# Patient Record
Sex: FEMALE | NOT HISPANIC OR LATINO | URBAN - METROPOLITAN AREA
[De-identification: names, ages, dates, MRNs, and addresses within clinical notes are randomized per-mention and may not be internally consistent; named-entity substitution may affect disease eponyms.]

---

## 2017-01-24 ENCOUNTER — OFFICE (OUTPATIENT)
Dept: URBAN - METROPOLITAN AREA OTHER 6 | Facility: OTHER | Age: 42
End: 2017-01-24

## 2017-01-24 VITALS
HEART RATE: 88 BPM | HEIGHT: 65 IN | WEIGHT: 153 LBS | DIASTOLIC BLOOD PRESSURE: 68 MMHG | SYSTOLIC BLOOD PRESSURE: 120 MMHG

## 2017-01-24 DIAGNOSIS — K59.00 CONSTIPATION, UNSPECIFIED: ICD-10-CM

## 2017-01-24 DIAGNOSIS — K22.4 DYSKINESIA OF ESOPHAGUS: ICD-10-CM

## 2017-01-24 PROCEDURE — 99202 OFFICE O/P NEW SF 15 MIN: CPT

## 2017-01-24 RX ORDER — POLYETHYLENE GLYCOL 3350 17 G/17G
17 POWDER, FOR SOLUTION ORAL
Qty: 1 | Refills: 11 | Status: ACTIVE
Start: 2017-01-24

## 2017-01-24 RX ORDER — OMEPRAZOLE 40 MG/1
40 CAPSULE, DELAYED RELEASE ORAL
Qty: 30 | Refills: 11 | Status: ACTIVE
Start: 2017-01-24

## 2017-03-28 ENCOUNTER — OFFICE (OUTPATIENT)
Dept: URBAN - METROPOLITAN AREA OTHER 6 | Facility: OTHER | Age: 42
End: 2017-03-28

## 2017-03-28 VITALS
WEIGHT: 158 LBS | HEIGHT: 65 IN | HEART RATE: 72 BPM | DIASTOLIC BLOOD PRESSURE: 70 MMHG | SYSTOLIC BLOOD PRESSURE: 120 MMHG

## 2017-03-28 DIAGNOSIS — K59.00 CONSTIPATION, UNSPECIFIED: ICD-10-CM

## 2017-03-28 DIAGNOSIS — K22.4 DYSKINESIA OF ESOPHAGUS: ICD-10-CM

## 2017-03-28 PROCEDURE — 99212 OFFICE O/P EST SF 10 MIN: CPT

## 2017-07-12 ENCOUNTER — TELEPHONE (OUTPATIENT)
Dept: OBSTETRICS AND GYNECOLOGY | Age: 42
End: 2017-07-12

## 2017-07-12 DIAGNOSIS — R01.1 NEWLY RECOGNIZED HEART MURMUR: Primary | ICD-10-CM

## 2017-10-09 ENCOUNTER — TRANSCRIBE ORDERS (OUTPATIENT)
Dept: ADMINISTRATIVE | Facility: HOSPITAL | Age: 42
End: 2017-10-09

## 2017-10-09 DIAGNOSIS — Z12.39 ENCOUNTER FOR SCREENING BREAST EXAMINATION: Primary | ICD-10-CM

## 2017-10-18 ENCOUNTER — HOSPITAL ENCOUNTER (OUTPATIENT)
Dept: MAMMOGRAPHY | Facility: HOSPITAL | Age: 42
Discharge: HOME | End: 2017-10-18
Admitting: OBSTETRICS & GYNECOLOGY

## 2017-10-18 DIAGNOSIS — Z12.39 ENCOUNTER FOR SCREENING BREAST EXAMINATION: ICD-10-CM

## 2017-10-18 PROCEDURE — G0202 SCR MAMMO BI INCL CAD: HCPCS

## 2018-02-01 ENCOUNTER — OFFICE VISIT (OUTPATIENT)
Dept: OBSTETRICS AND GYNECOLOGY | Age: 43
End: 2018-02-01

## 2018-02-01 VITALS
BODY MASS INDEX: 26.02 KG/M2 | DIASTOLIC BLOOD PRESSURE: 68 MMHG | WEIGHT: 156.2 LBS | HEIGHT: 65 IN | SYSTOLIC BLOOD PRESSURE: 138 MMHG

## 2018-02-01 DIAGNOSIS — E03.4 HYPOTHYROIDISM DUE TO ACQUIRED ATROPHY OF THYROID: ICD-10-CM

## 2018-02-01 DIAGNOSIS — K21.9 GASTROESOPHAGEAL REFLUX DISEASE, ESOPHAGITIS PRESENCE NOT SPECIFIED: ICD-10-CM

## 2018-02-01 DIAGNOSIS — Z00.00 ANNUAL PHYSICAL EXAM: ICD-10-CM

## 2018-02-01 DIAGNOSIS — N95.1 MENOPAUSAL SYMPTOMS: ICD-10-CM

## 2018-02-01 DIAGNOSIS — K58.1 IRRITABLE BOWEL SYNDROME WITH CONSTIPATION: ICD-10-CM

## 2018-02-01 DIAGNOSIS — Z78.0 MENOPAUSE: Primary | ICD-10-CM

## 2018-02-01 PROCEDURE — 99396 PREV VISIT EST AGE 40-64: CPT | Performed by: OBSTETRICS & GYNECOLOGY

## 2018-02-01 RX ORDER — OMEPRAZOLE 40 MG/1
CAPSULE, DELAYED RELEASE ORAL
Refills: 11 | COMMUNITY
Start: 2017-12-22 | End: 2019-06-03 | Stop reason: SDUPTHER

## 2018-02-01 RX ORDER — ACYCLOVIR 400 MG/1
400 TABLET ORAL DAILY
Qty: 90 TABLET | Refills: 1 | Status: SHIPPED | OUTPATIENT
Start: 2018-02-01 | End: 2021-11-16 | Stop reason: SDUPTHER

## 2018-02-01 NOTE — PROGRESS NOTES
Subjective   Nikole Barnes is a 42 y.o. female is being seen today for an annual exam.  Chief Complaint   Patient presents with   • Gynecologic Exam   .    History of Present Illness  Patient is here for an annual check.  Her family is doing well her daughter is a nurse at [employer] L&[employer] the other kids okay at this time.  No new family history.  Physical problem with a brother which she creates some family difficulties.  She did go see Dr. montaño and the patient and she is much better he demonstrate Natrecor off of medicines she takes MiraLAX every day and it seemed to help immensely.  She went through tummy tuck did fairly well like to pain things are quite perfect but overall is fairly fairly good.  She also needs a PCP.  I gave her the number to call for that at [facility].  She does also complain of a few menopausal symptoms so we will check a blood level today  The following portions of the patient's history were reviewed and updated as appropriate: allergies, current medications, past family history, past medical history, past social history, past surgical history and problem list.    Vitals:    18 0945   BP: 138/68       PAST MEDICAL HISTORY  Past Medical History:   Diagnosis Date   • Disease of thyroid gland      OB History      Para Term  AB Living    3 3 3       SAB TAB Ectopic Multiple Live Births                Past Surgical History:   Procedure Laterality Date   • BACK SURGERY     • CHOLECYSTECTOMY     • HYSTERECTOMY       Family History   Problem Relation Age of Onset   • Breast cancer Mother    • Breast cancer Maternal Aunt      History   Smoking Status   • Never Smoker   Smokeless Tobacco   • Never Used       Current Outpatient Prescriptions:   •  acyclovir (ZOVIRAX) 400 MG tablet, Take 1 tablet by mouth Daily. Take no more than 5 doses a day., Disp: 90 tablet, Rfl: 1  •  ARMOUR THYROID 90 MG tablet, , Disp: , Rfl:   •  diltiazem (CARDIZEM) 30 MG tablet, One by mouth every 6 hours  as needed for esophageal spasm, Disp: 30 tablet, Rfl: 0  •  omeprazole (priLOSEC) 40 MG capsule, TK ONE C PO  QD, Disp: , Rfl: 11    There is no immunization history on file for this patient.    Review of Systems   Constitutional: Negative for chills, fatigue, fever and unexpected weight change.   Respiratory: Negative for shortness of breath and wheezing.    Cardiovascular: Negative for chest pain.   Gastrointestinal: Positive for constipation. Negative for abdominal distention, abdominal pain, blood in stool, diarrhea and nausea.   Genitourinary: Negative for difficulty urinating, dyspareunia, dysuria, frequency, hematuria, menstrual problem, pelvic pain, urgency and vaginal discharge.   Skin: Negative for rash.       Objective   Physical Exam   Constitutional: She is oriented to person, place, and time. Vital signs are normal. She appears well-developed and well-nourished.   Neck: No thyromegaly present.   Cardiovascular: Normal rate, regular rhythm and normal heart sounds.    Pulmonary/Chest: Effort normal. Right breast exhibits no inverted nipple, no mass, no nipple discharge, no skin change and no tenderness. Left breast exhibits no inverted nipple, no mass, no nipple discharge, no skin change and no tenderness. Breasts are symmetrical. There is no breast swelling.   Abdominal: Soft.   Genitourinary: Vagina normal and uterus normal. No breast tenderness, discharge or bleeding. Pelvic exam was performed with patient supine. No labial fusion. There is no rash, tenderness, lesion or injury on the right labia. There is no rash, tenderness, lesion or injury on the left labia. Cervix exhibits no motion tenderness, no discharge and no friability. Right adnexum displays no mass, no tenderness and no fullness. Left adnexum displays no mass, no tenderness and no fullness.   Neurological: She is alert and oriented to person, place, and time.   Psychiatric: She has a normal mood and affect.   Vitals  reviewed.        Assessment/Plan   Nikole was seen today for gynecologic exam.    Diagnoses and all orders for this visit:    Menopause  -     Follicle Stimulating Hormone    Annual physical exam    Irritable bowel syndrome with constipation    Menopausal symptoms    Hypothyroidism due to acquired atrophy of thyroid    Gastroesophageal reflux disease, esophagitis presence not specified    Other orders  -     acyclovir (ZOVIRAX) 400 MG tablet; Take 1 tablet by mouth Daily. Take no more than 5 doses a day.    Normal exam today.  Pap smear will be due next year.  Mammogram was October 2017.  She also felt something the left breast with minor discomfort but now cannot feel anything.  My exam was negative today.  No colonoscopy yet she'll have one at 44 diet and exercise were discussed come back in year

## 2018-02-02 LAB — FSH SERPL-ACNC: 16.6 MIU/ML

## 2018-02-26 ENCOUNTER — TELEPHONE (OUTPATIENT)
Dept: OBSTETRICS AND GYNECOLOGY | Age: 43
End: 2018-02-26

## 2018-02-27 ENCOUNTER — TRANSCRIBE ORDERS (OUTPATIENT)
Dept: ADMINISTRATIVE | Facility: HOSPITAL | Age: 43
End: 2018-02-27

## 2018-02-27 DIAGNOSIS — R31.21 ASYMPTOMATIC MICROSCOPIC HEMATURIA: Primary | ICD-10-CM

## 2018-03-02 ENCOUNTER — APPOINTMENT (OUTPATIENT)
Dept: CT IMAGING | Facility: HOSPITAL | Age: 43
End: 2018-03-02

## 2018-03-02 ENCOUNTER — HOSPITAL ENCOUNTER (OUTPATIENT)
Dept: GENERAL RADIOLOGY | Facility: HOSPITAL | Age: 43
Discharge: HOME | End: 2018-03-02

## 2018-03-02 ENCOUNTER — HOSPITAL ENCOUNTER (OUTPATIENT)
Dept: CT IMAGING | Facility: HOSPITAL | Age: 43
Discharge: HOME | End: 2018-03-02
Admitting: NURSE PRACTITIONER

## 2018-03-02 ENCOUNTER — TRANSCRIBE ORDERS (OUTPATIENT)
Dept: ADMINISTRATIVE | Facility: HOSPITAL | Age: 43
End: 2018-03-02

## 2018-03-02 DIAGNOSIS — R31.9 HEMATURIA, UNSPECIFIED TYPE: Primary | ICD-10-CM

## 2018-03-02 DIAGNOSIS — R31.21 ASYMPTOMATIC MICROSCOPIC HEMATURIA: ICD-10-CM

## 2018-03-02 DIAGNOSIS — R31.9 HEMATURIA, UNSPECIFIED TYPE: ICD-10-CM

## 2018-03-02 PROCEDURE — 74018 RADEX ABDOMEN 1 VIEW: CPT

## 2018-03-02 PROCEDURE — 74176 CT ABD & PELVIS W/O CONTRAST: CPT

## 2018-03-07 ENCOUNTER — TELEPHONE (OUTPATIENT)
Dept: OBSTETRICS AND GYNECOLOGY | Age: 43
End: 2018-03-07

## 2018-03-07 NOTE — TELEPHONE ENCOUNTER
----- Message from Molina Choudhary MD sent at 2/26/2018  9:06 AM EST -----  The patient she is definitely early menopause very little  minimally into menopause but starting nonetheless.  She might want to try estroven  over-the-counter initially and that does not help give me a call

## 2018-07-05 ENCOUNTER — OFFICE VISIT (OUTPATIENT)
Dept: GASTROENTEROLOGY | Facility: CLINIC | Age: 43
End: 2018-07-05

## 2018-07-05 VITALS
HEIGHT: 65 IN | SYSTOLIC BLOOD PRESSURE: 118 MMHG | DIASTOLIC BLOOD PRESSURE: 62 MMHG | BODY MASS INDEX: 25.19 KG/M2 | WEIGHT: 151.2 LBS

## 2018-07-05 DIAGNOSIS — K58.2 IRRITABLE BOWEL SYNDROME WITH BOTH CONSTIPATION AND DIARRHEA: ICD-10-CM

## 2018-07-05 DIAGNOSIS — Z12.11 ENCOUNTER FOR SCREENING FOR MALIGNANT NEOPLASM OF COLON: ICD-10-CM

## 2018-07-05 DIAGNOSIS — K21.9 GASTROESOPHAGEAL REFLUX DISEASE, ESOPHAGITIS PRESENCE NOT SPECIFIED: Primary | ICD-10-CM

## 2018-07-05 PROCEDURE — 99214 OFFICE O/P EST MOD 30 MIN: CPT | Performed by: INTERNAL MEDICINE

## 2018-07-05 RX ORDER — OMEPRAZOLE 40 MG/1
40 CAPSULE, DELAYED RELEASE ORAL DAILY
Qty: 90 CAPSULE | Refills: 3 | Status: SHIPPED | OUTPATIENT
Start: 2018-07-05 | End: 2020-06-10 | Stop reason: SDUPTHER

## 2018-07-05 RX ORDER — HYOSCYAMINE SULFATE EXTENDED-RELEASE 0.38 MG/1
0.38 TABLET ORAL EVERY 12 HOURS PRN
Qty: 60 TABLET | Refills: 12 | Status: SHIPPED | OUTPATIENT
Start: 2018-07-05 | End: 2022-10-25

## 2018-07-05 NOTE — PROGRESS NOTES
PATIENT INFORMATION  Nikole Barnes       - 1975    CHIEF COMPLAINT  Chief Complaint   Patient presents with   • Heartburn     follow up   • Abdominal Pain       HISTORY OF PRESENT ILLNESS  Stil with Post prandial cramps and diarrhea but no bleeding and no night awakening and no real food asocc and does treat her constripation with daily Miralax and goes 6-7 days a week        Heartburn   She complains of abdominal pain and heartburn. She reports no coughing, no early satiety, no hoarse voice or no sore throat. This is a chronic problem. The current episode started more than 1 year ago. The problem occurs rarely. The problem has been resolved. The heartburn duration is several minutes. The heartburn is of mild intensity. The heartburn does not wake her from sleep. The heartburn does not limit her activity. The heartburn doesn't change with position. Associated symptoms include fatigue. She has tried a PPI for the symptoms. The treatment provided significant relief. Past procedures include an EGD.   Abdominal Pain   This is a chronic problem. The current episode started more than 1 year ago. The onset quality is gradual. The problem occurs every several days. The pain is located in the LLQ and RLQ. The pain is at a severity of 6/10. The pain is moderate. The quality of the pain is colicky and cramping. The abdominal pain does not radiate. Associated symptoms include diarrhea. Pertinent negatives include no anorexia or constipation. The pain is aggravated by eating. The pain is relieved by bowel movements. The treatment provided no relief. Prior diagnostic workup includes lower endoscopy. Her past medical history is significant for GERD.           REVIEW OF SYSTEMS  Review of Systems   Constitutional: Positive for fatigue.   HENT: Negative for hoarse voice and sore throat.    Respiratory: Negative for cough.    Cardiovascular: Positive for palpitations.   Gastrointestinal: Positive for abdominal pain,  diarrhea and heartburn. Negative for anorexia and constipation.   Musculoskeletal: Positive for back pain.   Neurological: Positive for numbness.   Psychiatric/Behavioral: The patient is nervous/anxious.    All other systems reviewed and are negative.        ACTIVE PROBLEMS  Patient Active Problem List    Diagnosis   • Irritable bowel syndrome with both constipation and diarrhea [K58.2]   • Menopausal symptoms [N95.1]   • Hypothyroidism due to acquired atrophy of thyroid [E03.4]   • Gastroesophageal reflux disease [K21.9]         PAST MEDICAL HISTORY  Past Medical History:   Diagnosis Date   • Disease of thyroid gland          SURGICAL HISTORY  Past Surgical History:   Procedure Laterality Date   • BACK SURGERY     • CHOLECYSTECTOMY     • HYSTERECTOMY           FAMILY HISTORY  Family History   Problem Relation Age of Onset   • Breast cancer Mother    • Breast cancer Maternal Aunt          SOCIAL HISTORY  Social History     Occupational History   • Not on file.     Social History Main Topics   • Smoking status: Never Smoker   • Smokeless tobacco: Never Used   • Alcohol use No   • Drug use: No   • Sexual activity: Not on file         CURRENT MEDICATIONS    Current Outpatient Prescriptions:   •  ARMOUR THYROID 90 MG tablet, , Disp: , Rfl:   •  omeprazole (priLOSEC) 40 MG capsule, TK ONE C PO  QD, Disp: , Rfl: 11  •  acyclovir (ZOVIRAX) 400 MG tablet, Take 1 tablet by mouth Daily. Take no more than 5 doses a day., Disp: 90 tablet, Rfl: 1  •  diltiazem (CARDIZEM) 30 MG tablet, One by mouth every 6 hours as needed for esophageal spasm, Disp: 30 tablet, Rfl: 0  •  hyoscyamine (LEVBID) 0.375 MG 12 hr tablet, Take 1 tablet by mouth Every 12 (Twelve) Hours As Needed for Cramping., Disp: 60 tablet, Rfl: 12  •  omeprazole (priLOSEC) 40 MG capsule, Take 1 capsule by mouth Daily., Disp: 90 capsule, Rfl: 3    ALLERGIES  Align [acidophilus]    VITALS  Vitals:    07/05/18 0914   BP: 118/62   Weight: 68.6 kg (151 lb 3.2 oz)   Height:  "165.1 cm (65\")       LAST RESULTS   Office Visit on 02/01/2018   Component Date Value Ref Range Status   • FSH 02/01/2018 16.6  mIU/mL Final    Comment:                     Adult Female:                        Follicular phase      3.5 -  12.5                        Ovulation phase       4.7 -  21.5                        Luteal phase          1.7 -   7.7                        Postmenopausal       25.8 - 134.8       No results found.    PHYSICAL EXAM  Physical Exam   Constitutional: She is oriented to person, place, and time. She appears well-developed and well-nourished.   HENT:   Head: Normocephalic.   Mouth/Throat: Oropharynx is clear and moist.   Eyes: Conjunctivae and EOM are normal. Pupils are equal, round, and reactive to light. No scleral icterus.   Neck: Normal range of motion. Neck supple. No thyromegaly present.   Cardiovascular: Normal rate, regular rhythm, normal heart sounds and intact distal pulses.  Exam reveals no gallop.    No murmur heard.  Pulmonary/Chest: Effort normal and breath sounds normal. She has no wheezes. She has no rales.   Abdominal: Soft. Bowel sounds are normal. She exhibits no shifting dullness, no distension, no fluid wave, no abdominal bruit, no ascites and no mass. There is no hepatosplenomegaly. There is tenderness in the epigastric area. There is no guarding and negative Rojo's sign. Hernia confirmed negative in the ventral area.   Musculoskeletal: Normal range of motion. She exhibits no edema.   Lymphadenopathy:     She has no cervical adenopathy.   Neurological: She is alert and oriented to person, place, and time.   Skin: Skin is warm and dry. No rash noted. She is not diaphoretic. No erythema.   Psychiatric: She has a normal mood and affect. Her behavior is normal.       ASSESSMENT  Diagnoses and all orders for this visit:    Gastroesophageal reflux disease, esophagitis presence not specified    Irritable bowel syndrome with both constipation and diarrhea  -   Home " lactose challenge    Encounter for screening for malignant neoplasm of colon   - Will put in for Recall in 2019    Other orders  -     omeprazole (priLOSEC) 40 MG capsule; Take 1 capsule by mouth Daily.  -     hyoscyamine (LEVBID) 0.375 MG 12 hr tablet; Take 1 tablet by mouth Every 12 (Twelve) Hours As Needed for Cramping.          PLAN  Return in about 3 months (around 10/5/2018).

## 2018-10-15 ENCOUNTER — OFFICE VISIT (OUTPATIENT)
Dept: GASTROENTEROLOGY | Facility: CLINIC | Age: 43
End: 2018-10-15

## 2018-10-15 VITALS
HEIGHT: 65 IN | WEIGHT: 145.2 LBS | BODY MASS INDEX: 24.19 KG/M2 | DIASTOLIC BLOOD PRESSURE: 66 MMHG | SYSTOLIC BLOOD PRESSURE: 120 MMHG

## 2018-10-15 DIAGNOSIS — K58.2 IRRITABLE BOWEL SYNDROME WITH BOTH CONSTIPATION AND DIARRHEA: Primary | ICD-10-CM

## 2018-10-15 DIAGNOSIS — Z83.71 FAMILY HISTORY OF POLYPS IN THE COLON: ICD-10-CM

## 2018-10-15 DIAGNOSIS — K21.9 GASTROESOPHAGEAL REFLUX DISEASE, ESOPHAGITIS PRESENCE NOT SPECIFIED: ICD-10-CM

## 2018-10-15 PROCEDURE — 99213 OFFICE O/P EST LOW 20 MIN: CPT | Performed by: INTERNAL MEDICINE

## 2018-10-15 NOTE — PROGRESS NOTES
PATIENT INFORMATION  Nikole Barnes       - 1975    CHIEF COMPLAINT  Chief Complaint   Patient presents with   • Constipation     2 mo follow up   • Diarrhea   • Heartburn       HISTORY OF PRESENT ILLNESS  Follow up of GERD and Functional Constipation and uses miralax and could NOT tolerate the LEVBID due to Diarrhea, so is on Omeprazole and Miralax only her colo0n is due 2019 and no reason for an EGD at this point.    Has had 2 colon in the past and was prob at [facility] by Chase and Jyoti but last one was  and negative, but her father has had polyps so will start q 5 yr exams next year.            REVIEW OF SYSTEMS  Review of Systems   Constitutional: Positive for activity change and fatigue.   Gastrointestinal: Positive for constipation and diarrhea.        Reflux   Musculoskeletal: Positive for back pain.   Neurological: Positive for headaches.   Psychiatric/Behavioral: Positive for sleep disturbance. The patient is nervous/anxious.    All other systems reviewed and are negative.        ACTIVE PROBLEMS  Patient Active Problem List    Diagnosis   • Irritable bowel syndrome with both constipation and diarrhea [K58.2]   • Menopausal symptoms [N95.1]   • Hypothyroidism due to acquired atrophy of thyroid [E03.4]   • Gastroesophageal reflux disease [K21.9]         PAST MEDICAL HISTORY  Past Medical History:   Diagnosis Date   • Disease of thyroid gland          SURGICAL HISTORY  Past Surgical History:   Procedure Laterality Date   • BACK SURGERY     • CHOLECYSTECTOMY     • COLONOSCOPY     • HYSTERECTOMY           FAMILY HISTORY  Family History   Problem Relation Age of Onset   • Breast cancer Mother    • Breast cancer Maternal Aunt    • Colon polyps Father    • Colon polyps Paternal Grandmother    • Colon cancer Neg Hx          SOCIAL HISTORY  Social History     Occupational History   • Not on file.     Social History Main Topics   • Smoking status: Never Smoker   • Smokeless tobacco: Never Used   •  "Alcohol use No   • Drug use: No   • Sexual activity: Not on file       Debilities/Disabilities Identified: None    Emotional Behavior: Appropriate    CURRENT MEDICATIONS    Current Outpatient Prescriptions:   •  acyclovir (ZOVIRAX) 400 MG tablet, Take 1 tablet by mouth Daily. Take no more than 5 doses a day., Disp: 90 tablet, Rfl: 1  •  ARMOUR THYROID 90 MG tablet, , Disp: , Rfl:   •  diltiazem (CARDIZEM) 30 MG tablet, One by mouth every 6 hours as needed for esophageal spasm, Disp: 30 tablet, Rfl: 0  •  hyoscyamine (LEVBID) 0.375 MG 12 hr tablet, Take 1 tablet by mouth Every 12 (Twelve) Hours As Needed for Cramping., Disp: 60 tablet, Rfl: 12  •  omeprazole (priLOSEC) 40 MG capsule, TK ONE C PO  QD, Disp: , Rfl: 11  •  omeprazole (priLOSEC) 40 MG capsule, Take 1 capsule by mouth Daily., Disp: 90 capsule, Rfl: 3    ALLERGIES  Align [acidophilus]    VITALS  Vitals:    10/15/18 1400   BP: 120/66   Weight: 65.9 kg (145 lb 3.2 oz)   Height: 165.1 cm (65\")       LAST RESULTS   Office Visit on 02/01/2018   Component Date Value Ref Range Status   • FSH 02/01/2018 16.6  mIU/mL Final    Comment:                     Adult Female:                        Follicular phase      3.5 -  12.5                        Ovulation phase       4.7 -  21.5                        Luteal phase          1.7 -   7.7                        Postmenopausal       25.8 - 134.8       No results found.    PHYSICAL EXAM  Physical Exam   Constitutional: She is oriented to person, place, and time. She appears well-developed and well-nourished.   HENT:   Head: Normocephalic and atraumatic.   Eyes: Pupils are equal, round, and reactive to light. Conjunctivae and EOM are normal. No scleral icterus.   Neck: Normal range of motion. Neck supple. No thyromegaly present.   Cardiovascular: Normal rate, regular rhythm, normal heart sounds and intact distal pulses.  Exam reveals no gallop.    No murmur heard.  Pulmonary/Chest: Effort normal and breath sounds normal. " She has no wheezes. She has no rales.   Abdominal: Soft. Bowel sounds are normal. She exhibits no shifting dullness, no distension, no fluid wave, no abdominal bruit, no ascites and no mass. There is no hepatosplenomegaly. There is no tenderness. There is no guarding and negative Rojo's sign. Hernia confirmed negative in the ventral area.   Musculoskeletal: Normal range of motion. She exhibits no edema.   Lymphadenopathy:     She has no cervical adenopathy.   Neurological: She is alert and oriented to person, place, and time.   Skin: Skin is warm and dry. No rash noted. She is not diaphoretic. No erythema.   Psychiatric: She has a normal mood and affect. Her behavior is normal.       ASSESSMENT  Diagnoses and all orders for this visit:    Irritable bowel syndrome with both constipation and diarrhea    Gastroesophageal reflux disease, esophagitis presence not specified    Family history of polyps in the colon          PLAN  Return in about 6 months (around 4/15/2019).     If we cant locate her last colon (2009) then recall on her birth month (Aug)

## 2018-11-06 ENCOUNTER — APPOINTMENT (OUTPATIENT)
Dept: ONCOLOGY | Facility: CLINIC | Age: 43
End: 2018-11-06

## 2018-11-06 ENCOUNTER — APPOINTMENT (OUTPATIENT)
Dept: LAB | Facility: HOSPITAL | Age: 43
End: 2018-11-06

## 2018-11-07 ENCOUNTER — CONSULT (OUTPATIENT)
Dept: ONCOLOGY | Facility: CLINIC | Age: 43
End: 2018-11-07

## 2018-11-07 ENCOUNTER — LAB (OUTPATIENT)
Dept: LAB | Facility: HOSPITAL | Age: 43
End: 2018-11-07

## 2018-11-07 ENCOUNTER — APPOINTMENT (OUTPATIENT)
Dept: LAB | Facility: HOSPITAL | Age: 43
End: 2018-11-07

## 2018-11-07 VITALS
TEMPERATURE: 99.2 F | BODY MASS INDEX: 23.37 KG/M2 | HEIGHT: 66 IN | SYSTOLIC BLOOD PRESSURE: 122 MMHG | RESPIRATION RATE: 16 BRPM | WEIGHT: 145.4 LBS | DIASTOLIC BLOOD PRESSURE: 80 MMHG | HEART RATE: 84 BPM | OXYGEN SATURATION: 100 %

## 2018-11-07 DIAGNOSIS — R79.89 ABNORMAL CBC: Primary | ICD-10-CM

## 2018-11-07 DIAGNOSIS — C43.59 MALIGNANT MELANOMA OF TORSO EXCLUDING BREAST (HCC): Primary | ICD-10-CM

## 2018-11-07 PROBLEM — C43.9 MELANOMA (HCC): Status: ACTIVE | Noted: 2018-11-07

## 2018-11-07 LAB
BASOPHILS # BLD AUTO: 0.03 10*3/MM3 (ref 0–0.1)
BASOPHILS NFR BLD AUTO: 0.3 % (ref 0–1.1)
DEPRECATED RDW RBC AUTO: 39.3 FL (ref 37–49)
EOSINOPHIL # BLD AUTO: 0.04 10*3/MM3 (ref 0–0.36)
EOSINOPHIL NFR BLD AUTO: 0.4 % (ref 1–5)
ERYTHROCYTE [DISTWIDTH] IN BLOOD BY AUTOMATED COUNT: 11.9 % (ref 11.7–14.5)
HCT VFR BLD AUTO: 40.4 % (ref 34–45)
HGB BLD-MCNC: 13.6 G/DL (ref 11.5–14.9)
IMM GRANULOCYTES # BLD: 0.04 10*3/MM3 (ref 0–0.03)
IMM GRANULOCYTES NFR BLD: 0.4 % (ref 0–0.5)
LYMPHOCYTES # BLD AUTO: 2.69 10*3/MM3 (ref 1–3.5)
LYMPHOCYTES NFR BLD AUTO: 26.7 % (ref 20–49)
MCH RBC QN AUTO: 30.4 PG (ref 27–33)
MCHC RBC AUTO-ENTMCNC: 33.7 G/DL (ref 32–35)
MCV RBC AUTO: 90.4 FL (ref 83–97)
MONOCYTES # BLD AUTO: 0.77 10*3/MM3 (ref 0.25–0.8)
MONOCYTES NFR BLD AUTO: 7.6 % (ref 4–12)
NEUTROPHILS # BLD AUTO: 6.52 10*3/MM3 (ref 1.5–7)
NEUTROPHILS NFR BLD AUTO: 64.6 % (ref 39–75)
NRBC BLD MANUAL-RTO: 0 /100 WBC (ref 0–0)
PLATELET # BLD AUTO: 207 10*3/MM3 (ref 150–375)
PMV BLD AUTO: 9.9 FL (ref 8.9–12.1)
RBC # BLD AUTO: 4.47 10*6/MM3 (ref 3.9–5)
WBC NRBC COR # BLD: 10.09 10*3/MM3 (ref 4–10)

## 2018-11-07 PROCEDURE — 36415 COLL VENOUS BLD VENIPUNCTURE: CPT | Performed by: INTERNAL MEDICINE

## 2018-11-07 PROCEDURE — 99245 OFF/OP CONSLTJ NEW/EST HI 55: CPT | Performed by: INTERNAL MEDICINE

## 2018-11-07 PROCEDURE — 85025 COMPLETE CBC W/AUTO DIFF WBC: CPT | Performed by: INTERNAL MEDICINE

## 2018-11-07 NOTE — PROGRESS NOTES
.     REASON FOR CONSULTATION:   History of melanoma and recently reported weight loss.  Provide an opinion on any further workup or treatment                             REQUESTING PHYSICIAN: Josue Barger MD  RECORDS OBTAINED:  Records of the patients history including those obtained from the referring provider were reviewed and summarized in detail.    HISTORY OF PRESENT ILLNESS:  The patient is a 43 y.o. year old female  who is here for follow-up with the above-mentioned history.    Biopsy-proven superficial spreading melanoma 0.2 mm in depth without ulceration or abnormal mitotic activity on the right back over the scapular area.  Denzel's level II.  Margins positive. PT1a.    Excised by Dr. Cuellar on 8/6/18: No definitive invasive melanoma.  There was however residual melanoma in situ which and the pathologist opinion was excised with this excision and it was recommended no further surgical intervention.  The pathologist stated this area should be followed and consideration could be given to postoperative topical immunomodulating agent such as Aldara (imiquimod).    When she saw Dr. Barger, her dermatologist, on 10/31/18, she complained of a 13 pound weight loss since September.  She states she was basing this weight off await that was obtained at her PCP office.  She had not been weighing at home.    Fortunately, we have several weights in the [employer] system for my review.  On 11/7/18 (today), 145 pounds, from 145 pounds on 10/15/18, from 151 pounds on 7/5/18, from 156 pounds on 2/1/18, from 153 pounds on 12/2/16, from 145 pounds on 8/19/16.  Therefore, her weight tends to fluctuate and she seems to be within her usual fluctuating weight.    Denies neurological symptoms, shortness of air, nausea, pain (other than her chronic back pain which has been present for years and unchanged.)      Past Medical History:   Diagnosis Date   • Disease of thyroid gland    • CAVANAUGH (dyspnea on exertion) 2017   • Heart  murmur    • History of constipation    • History of esophageal spasm    • History of gallstones    • IBS (irritable bowel syndrome)    • Melanoma (CMS/HCC)     x2     Past Surgical History:   Procedure Laterality Date   • BACK SURGERY  2008    Lumbar   • CHOLECYSTECTOMY  2009   • COLONOSCOPY  2009   • HYSTERECTOMY  2013    Partial   • HYSTEROSCOPY ENDOMETRIAL ABLATION     • SALPINGO OOPHORECTOMY  2014   • TUBAL ABDOMINAL LIGATION         HEMATOLOGIC/ONCOLOGIC HISTORY:  (History from previous dates can be found in the separate document.)    MEDICATIONS    Current Outpatient Prescriptions:   •  acyclovir (ZOVIRAX) 400 MG tablet, Take 1 tablet by mouth Daily. Take no more than 5 doses a day., Disp: 90 tablet, Rfl: 1  •  ARMOUR THYROID 90 MG tablet, , Disp: , Rfl:   •  diltiazem (CARDIZEM) 30 MG tablet, One by mouth every 6 hours as needed for esophageal spasm, Disp: 30 tablet, Rfl: 0  •  omeprazole (priLOSEC) 40 MG capsule, Take 1 capsule by mouth Daily., Disp: 90 capsule, Rfl: 3  •  hyoscyamine (LEVBID) 0.375 MG 12 hr tablet, Take 1 tablet by mouth Every 12 (Twelve) Hours As Needed for Cramping., Disp: 60 tablet, Rfl: 12  •  omeprazole (priLOSEC) 40 MG capsule, TK ONE C PO  QD, Disp: , Rfl: 11    ALLERGIES:     Allergies   Allergen Reactions   • Align [Acidophilus] Itching and Swelling       SOCIAL HISTORY:       Social History     Social History   • Marital status:      Spouse name: N/A   • Number of children: 3   • Years of education: N/A     Occupational History   •  [employer]     Social History Main Topics   • Smoking status: Never Smoker   • Smokeless tobacco: Never Used   • Alcohol use No   • Drug use: No   • Sexual activity: Not on file     Other Topics Concern   • Not on file     Social History Narrative   • No narrative on file         FAMILY HISTORY:  Family History   Problem Relation Age of Onset   • Breast cancer Mother    • Breast cancer Maternal Aunt    • Colon polyps Father   "  • Colon polyps Paternal Grandmother    • Heart disease Maternal Grandmother         CABG in her 50s   • Colon cancer Neg Hx        REVIEW OF SYSTEMS:  Review of Systems   Constitutional: Negative for activity change.   HENT: Negative for nosebleeds and trouble swallowing.    Respiratory: Negative for shortness of breath and wheezing.    Cardiovascular: Negative for chest pain and palpitations.   Gastrointestinal: Negative for constipation, diarrhea and nausea.   Genitourinary: Negative for dysuria and hematuria.   Musculoskeletal: Negative for arthralgias and myalgias.   Skin: Negative for rash and wound.   Neurological: Negative for seizures and syncope.   Hematological: Negative for adenopathy. Does not bruise/bleed easily.   Psychiatric/Behavioral: Negative for confusion.              Vitals:    11/07/18 1504   BP: 122/80   Pulse: 84   Resp: 16   Temp: 99.2 °F (37.3 °C)   SpO2: 100%  Comment: at rest   Weight: 66 kg (145 lb 6.4 oz)   Height: 167 cm (65.75\")   PainSc: 0-No pain     Current Status 11/7/2018   ECOG score 0      PHYSICAL EXAM:    CONSTITUTIONAL:  Vital signs reviewed.  No distress, looks comfortable.  EYES:  Conjunctiva and lids unremarkable.  PERRLA  EARS,NOSE,MOUTH,THROAT:  Ears and nose appear unremarkable.  Lips, teeth, gums appear unremarkable.  RESPIRATORY:  Normal respiratory effort.  Lungs clear to auscultation bilaterally.  CARDIOVASCULAR:  Normal S1, S2.  No murmurs rubs or gallops.  No significant lower extremity edema.  GASTROINTESTINAL: Abdomen appears unremarkable.  Nontender.  No hepatomegaly.  No splenomegaly.  LYMPHATIC:  No cervical, supraclavicular, axillary lymphadenopathy.  SKIN:  Warm.  No rashes.  PSYCHIATRIC:  Normal judgment and insight.  Normal mood and affect.      RECENT LABS:        WBC   Date Value Ref Range Status   11/07/2018 10.09 (H) 4.00 - 10.00 10*3/mm3 Final   08/19/2016 7.89 4.80 - 10.80 10*3/mm3 Final     Hemoglobin   Date Value Ref Range Status   11/07/2018 " 13.6 11.5 - 14.9 g/dL Final   08/19/2016 13.4 12.0 - 16.0 g/dL Final     Platelets   Date Value Ref Range Status   11/07/2018 207 150 - 375 10*3/mm3 Final   08/19/2016 304 140 - 500 10*3/mm3 Final       Assessment/Plan   Malignant melanoma of torso excluding breast (CMS/HCC)      *Melanoma, superficial spreading, right back, scapular area.  0.2 mm in depth without ulceration or mitotic activity.  Denzel's level II.  Margins were positive on biopsy but subsequent excision 8/6/18 showed no definite invasive melanoma.  There was residual melanoma in situ which the pathologists believed was completely excised.  This was a [id number], stage I melanoma.    *Reported weight loss.  Although she thought she lost 13 pounds since September 2018, several weights in the "[employer] system report otherwise.  On 11/7/18 (today), 145 pounds, from 145 pounds on 10/15/18, from 151 pounds on 7/5/18, from 156 pounds on 2/1/18, from 153 pounds on 12/2/16, from 145 pounds on 8/19/16.  Therefore, her weight tends to fluctuate and she seems to be within her usual fluctuating weight.  She thinks her weight may fluctuate due to IBS.  When she has flares of IBS, she eats less.  She has no other symptoms to suggest recurrence.  Therefore, I do not think imaging is indicated.  Patient agrees.    *History of sun exposure.  I recommended avoiding the sun.  I told her she is at increased risk to develop new skin cancers, the most concerning would be new melanomas.    Plan  No scheduled follow-up    Outside records were reviewed and summarized.  I discussed this case with Dr. Kadeem Hinojosa who was initially scheduled to see this patient.  Patient changed her appointment to today.  Dr. Hinojosa and I agree with this plan.

## 2018-11-19 ENCOUNTER — DOCUMENTATION (OUTPATIENT)
Dept: ONCOLOGY | Facility: CLINIC | Age: 43
End: 2018-11-19

## 2018-11-19 NOTE — PROGRESS NOTES
OSW initiated a phone call to the pt to follow up on her Distress Questionnaire completed on 11/7/18 on which she scored 8/10. The pt had an initial medical oncology consultation with Dr. Tineo at [place] for stage 1 melanoma. She does not have a follow up appointment at this time; now treatment was recommended as the melanoma was completely excised.     OSW left a voicemail requesting callback at 11:35 am as the pt did not answer.    OSW remains available as needs arise.    [facility]os [facility]  Oncology Social Worker

## 2019-04-15 ENCOUNTER — OFFICE VISIT (OUTPATIENT)
Dept: GASTROENTEROLOGY | Facility: CLINIC | Age: 44
End: 2019-04-15

## 2019-04-15 VITALS
WEIGHT: 154.8 LBS | SYSTOLIC BLOOD PRESSURE: 124 MMHG | DIASTOLIC BLOOD PRESSURE: 76 MMHG | BODY MASS INDEX: 24.88 KG/M2 | HEIGHT: 66 IN

## 2019-04-15 DIAGNOSIS — Z83.71 FAMILY HISTORY OF POLYPS IN THE COLON: ICD-10-CM

## 2019-04-15 DIAGNOSIS — K21.9 GASTROESOPHAGEAL REFLUX DISEASE, ESOPHAGITIS PRESENCE NOT SPECIFIED: ICD-10-CM

## 2019-04-15 DIAGNOSIS — K58.2 IRRITABLE BOWEL SYNDROME WITH BOTH CONSTIPATION AND DIARRHEA: ICD-10-CM

## 2019-04-15 DIAGNOSIS — Z12.11 ENCOUNTER FOR SCREENING FOR MALIGNANT NEOPLASM OF COLON: Primary | ICD-10-CM

## 2019-04-15 DIAGNOSIS — R14.0 BLOATING: ICD-10-CM

## 2019-04-15 PROBLEM — Z83.719 FAMILY HISTORY OF POLYPS IN THE COLON: Status: ACTIVE | Noted: 2019-04-15

## 2019-04-15 PROCEDURE — 99214 OFFICE O/P EST MOD 30 MIN: CPT | Performed by: INTERNAL MEDICINE

## 2019-04-15 RX ORDER — CITALOPRAM 20 MG/1
TABLET ORAL
COMMUNITY
Start: 2019-02-28 | End: 2019-04-15

## 2019-04-15 NOTE — PROGRESS NOTES
PATIENT INFORMATION  Nikole Barnes       - 1975    CHIEF COMPLAINT  Chief Complaint   Patient presents with   • Follow-up     6 mo follow up on IBS-C&D       HISTORY OF PRESENT ILLNESS  Daily Miralax and stillnot moving her bowels for 2-3 days at a time. Then will have diarrhea that confines her to the house I reviewed her KUB with stool thru out her coln as well chandrika previous rxn to LEVBID-Diarrhea! And due to her father's polyps will checkl evrey 5 and go aheasd and schedule this one as her schedule permits    Goes on to describe bloating and worsened constipation after salads and worisome for gastroparesis but no vomitign and no wieght loss            REVIEW OF SYSTEMS  Review of Systems   Constitutional: Positive for fatigue.   Gastrointestinal: Positive for abdominal distention, abdominal pain, constipation, diarrhea and nausea.        Reflux   Musculoskeletal: Positive for arthralgias and back pain.   Allergic/Immunologic: Positive for environmental allergies.   Neurological: Positive for dizziness.   Psychiatric/Behavioral: The patient is nervous/anxious.    All other systems reviewed and are negative.        ACTIVE PROBLEMS  Patient Active Problem List    Diagnosis   • Family history of polyps in the colon [Z83.71]   • Bloating [R14.0]   • Encounter for screening for malignant neoplasm of colon [Z12.11]   • Melanoma (CMS/HCC) [C43.9]   • Irritable bowel syndrome with both constipation and diarrhea [K58.2]   • Menopausal symptoms [N95.1]   • Hypothyroidism due to acquired atrophy of thyroid [E03.4]   • Gastroesophageal reflux disease [K21.9]         PAST MEDICAL HISTORY  Past Medical History:   Diagnosis Date   • Arthritis    • Colon polyp    • Disease of thyroid gland    • CAVANAUGH (dyspnea on exertion) 2017   • H/O foreign travel 2018    [place]   • Heart murmur    • History of constipation    • History of esophageal spasm    • History of gallstones    • IBS (irritable bowel syndrome)    •  Kidney stones    • Melanoma (CMS/HCC)     x2   • Tattoos          SURGICAL HISTORY  Past Surgical History:   Procedure Laterality Date   • BACK SURGERY  2008    Lumbar   • CHOLECYSTECTOMY  2009   • COLONOSCOPY  2009   • HYSTERECTOMY  2013    Partial   • HYSTEROSCOPY ENDOMETRIAL ABLATION     • SALPINGO OOPHORECTOMY  2014   • TUBAL ABDOMINAL LIGATION           FAMILY HISTORY  Family History   Problem Relation Age of Onset   • Breast cancer Mother    • Breast cancer Maternal Aunt    • Colon polyps Father    • Colon polyps Paternal Grandmother    • Heart disease Maternal Grandmother         CABG in her 50s   • Hypertension Other    • Cancer Other    • Diabetes Other    • Colon cancer Neg Hx          SOCIAL HISTORY  Social History     Occupational History   • Occupation: Teller     Comment: [employer]   Tobacco Use   • Smoking status: Never Smoker   • Smokeless tobacco: Never Used   Substance and Sexual Activity   • Alcohol use: Yes     Comment: Occasional   • Drug use: Yes     Comment: Percocet   • Sexual activity: Not on file       Debilities/Disabilities Identified: None    Emotional Behavior: Appropriate    CURRENT MEDICATIONS    Current Outpatient Medications:   •  acyclovir (ZOVIRAX) 400 MG tablet, Take 1 tablet by mouth Daily. Take no more than 5 doses a day., Disp: 90 tablet, Rfl: 1  •  ARMOUR THYROID 90 MG tablet, , Disp: , Rfl:   •  diltiazem (CARDIZEM) 30 MG tablet, One by mouth every 6 hours as needed for esophageal spasm, Disp: 30 tablet, Rfl: 0  •  hyoscyamine (LEVBID) 0.375 MG 12 hr tablet, Take 1 tablet by mouth Every 12 (Twelve) Hours As Needed for Cramping., Disp: 60 tablet, Rfl: 12  •  linaclotide (LINZESS) 72 MCG capsule capsule, Take 1 capsule by mouth Every Morning Before Breakfast., Disp: 30 capsule, Rfl: 11  •  omeprazole (priLOSEC) 40 MG capsule, TK ONE C PO  QD, Disp: , Rfl: 11  •  omeprazole (priLOSEC) 40 MG capsule, Take 1 capsule by mouth Daily., Disp: 90 capsule, Rfl:  "3    ALLERGIES  Align [acidophilus]    VITALS  Vitals:    04/15/19 1526   BP: 124/76   Weight: 70.2 kg (154 lb 12.8 oz)   Height: 167 cm (65.75\")       LAST RESULTS   Lab on 11/07/2018   Component Date Value Ref Range Status   • WBC 11/07/2018 10.09* 4.00 - 10.00 10*3/mm3 Final   • RBC 11/07/2018 4.47  3.90 - 5.00 10*6/mm3 Final   • Hemoglobin 11/07/2018 13.6  11.5 - 14.9 g/dL Final   • Hematocrit 11/07/2018 40.4  34.0 - 45.0 % Final   • MCV 11/07/2018 90.4  83.0 - 97.0 fL Final   • MCH 11/07/2018 30.4  27.0 - 33.0 pg Final   • MCHC 11/07/2018 33.7  32.0 - 35.0 g/dL Final   • RDW 11/07/2018 11.9  11.7 - 14.5 % Final   • RDW-SD 11/07/2018 39.3  37.0 - 49.0 fl Final   • MPV 11/07/2018 9.9  8.9 - 12.1 fL Final   • Platelets 11/07/2018 207  150 - 375 10*3/mm3 Final   • Neutrophil % 11/07/2018 64.6  39.0 - 75.0 % Final   • Lymphocyte % 11/07/2018 26.7  20.0 - 49.0 % Final   • Monocyte % 11/07/2018 7.6  4.0 - 12.0 % Final   • Eosinophil % 11/07/2018 0.4* 1.0 - 5.0 % Final   • Basophil % 11/07/2018 0.3  0.0 - 1.1 % Final   • Immature Grans % 11/07/2018 0.4  0.0 - 0.5 % Final   • Neutrophils, Absolute 11/07/2018 6.52  1.50 - 7.00 10*3/mm3 Final   • Lymphocytes, Absolute 11/07/2018 2.69  1.00 - 3.50 10*3/mm3 Final   • Monocytes, Absolute 11/07/2018 0.77  0.25 - 0.80 10*3/mm3 Final   • Eosinophils, Absolute 11/07/2018 0.04  0.00 - 0.36 10*3/mm3 Final   • Basophils, Absolute 11/07/2018 0.03  0.00 - 0.10 10*3/mm3 Final   • Immature Grans, Absolute 11/07/2018 0.04* 0.00 - 0.03 10*3/mm3 Final   • nRBC 11/07/2018 0.0  0.0 - 0.0 /100 WBC Final     No results found.    PHYSICAL EXAM  Physical Exam   Constitutional: She is oriented to person, place, and time. She appears well-developed and well-nourished.   HENT:   Head: Normocephalic and atraumatic.   Eyes: Conjunctivae and EOM are normal. Pupils are equal, round, and reactive to light. No scleral icterus.   Neck: Normal range of motion. Neck supple. No thyromegaly present. "   Cardiovascular: Normal rate, regular rhythm, normal heart sounds and intact distal pulses. Exam reveals no gallop.   No murmur heard.  Pulmonary/Chest: Effort normal and breath sounds normal. She has no wheezes. She has no rales.   Abdominal: Soft. Bowel sounds are normal. She exhibits no shifting dullness, no distension, no fluid wave, no abdominal bruit, no ascites and no mass. There is no hepatosplenomegaly. There is no tenderness. There is no guarding and negative Rojo's sign. Hernia confirmed negative in the ventral area.   Musculoskeletal: Normal range of motion. She exhibits no edema.   Lymphadenopathy:     She has no cervical adenopathy.   Neurological: She is alert and oriented to person, place, and time.   Skin: Skin is warm and dry. No rash noted. She is not diaphoretic. No erythema.   Psychiatric: She has a normal mood and affect. Her behavior is normal.       ASSESSMENT  Diagnoses and all orders for this visit:    Encounter for screening for malignant neoplasm of colon  -     External Facility Surgical / Procedural Request; Future  -     External Facility Surgical / Procedural Request; Future    Irritable bowel syndrome with both constipation and diarrhea    Bloating  -     External Facility Surgical / Procedural Request; Future    Gastroesophageal reflux disease, esophagitis presence not specified    Family history of polyps in the colon    Other orders  -     Discontinue: citalopram (CeleXA) 20 MG tablet  -     Obtain informed consent; Future  -     Obtain informed consent; Future  -     linaclotide (LINZESS) 72 MCG capsule capsule; Take 1 capsule by mouth Every Morning Before Breakfast.          PLAN  Return in about 2 months (around 6/15/2019).

## 2019-06-03 ENCOUNTER — OFFICE VISIT (OUTPATIENT)
Dept: OBSTETRICS AND GYNECOLOGY | Age: 44
End: 2019-06-03

## 2019-06-03 ENCOUNTER — PROCEDURE VISIT (OUTPATIENT)
Dept: OBSTETRICS AND GYNECOLOGY | Age: 44
End: 2019-06-03

## 2019-06-03 ENCOUNTER — APPOINTMENT (OUTPATIENT)
Dept: WOMENS IMAGING | Facility: HOSPITAL | Age: 44
End: 2019-06-03

## 2019-06-03 VITALS
HEIGHT: 66 IN | WEIGHT: 146 LBS | SYSTOLIC BLOOD PRESSURE: 128 MMHG | BODY MASS INDEX: 23.46 KG/M2 | DIASTOLIC BLOOD PRESSURE: 78 MMHG

## 2019-06-03 DIAGNOSIS — Z12.31 VISIT FOR SCREENING MAMMOGRAM: Primary | ICD-10-CM

## 2019-06-03 DIAGNOSIS — Z12.4 PAP SMEAR FOR CERVICAL CANCER SCREENING: Primary | ICD-10-CM

## 2019-06-03 DIAGNOSIS — Z01.419 WELL WOMAN EXAM WITH ROUTINE GYNECOLOGICAL EXAM: ICD-10-CM

## 2019-06-03 DIAGNOSIS — Z00.00 ANNUAL PHYSICAL EXAM: ICD-10-CM

## 2019-06-03 DIAGNOSIS — Z11.51 SCREENING FOR HPV (HUMAN PAPILLOMAVIRUS): ICD-10-CM

## 2019-06-03 PROBLEM — E03.9 ACQUIRED HYPOTHYROIDISM: Status: ACTIVE | Noted: 2018-02-26

## 2019-06-03 PROBLEM — R10.9 ABDOMINAL CRAMPING: Status: ACTIVE | Noted: 2019-04-15

## 2019-06-03 PROBLEM — K58.0 IRRITABLE BOWEL SYNDROME WITH DIARRHEA: Status: ACTIVE | Noted: 2018-02-01

## 2019-06-03 PROBLEM — R01.1 MURMUR: Status: ACTIVE | Noted: 2019-06-03

## 2019-06-03 PROCEDURE — 99396 PREV VISIT EST AGE 40-64: CPT | Performed by: OBSTETRICS & GYNECOLOGY

## 2019-06-03 PROCEDURE — 77067 SCR MAMMO BI INCL CAD: CPT | Performed by: OBSTETRICS & GYNECOLOGY

## 2019-06-03 PROCEDURE — 77067 SCR MAMMO BI INCL CAD: CPT | Performed by: RADIOLOGY

## 2019-06-03 RX ORDER — PREDNISONE 20 MG/1
TABLET ORAL
COMMUNITY
Start: 2019-05-08 | End: 2021-11-16

## 2019-06-03 NOTE — PROGRESS NOTES
Subjective   Nikole Barnes is a 43 y.o. female is being seen today for   Chief Complaint   Patient presents with   • Gynecologic Exam     AE and MG today.   C-scope = polyps, has c-scope scheduled next Monday (stomach issues).  New health hx - melanoma stage 2 removed from upper back right/middle shoulder.  Family hx of breast cancer.    .    History of Present Illness  Patient is here for her annual exam.  Her kids are doing well and there is nothing new to report in the family.  Her bowels are still the same with an excessive diarrhea there are try to find a source of that in fact she is having another colonoscopy and EGD next week.  Her bladder is doing well.  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  Things not quite as smooth as home as they could be.  And of course big news is melanoma taken off her back level 2 so she gets checked every 3 months for that.  The following portions of the patient's history were reviewed and updated as appropriate: allergies, current medications, past family history, past medical history, past social history, past surgical history and problem list.    Vitals:    19 1131   BP: 128/78       PAST MEDICAL HISTORY  Past Medical History:   Diagnosis Date   • Arthritis    • Colon polyp    • Disease of thyroid gland    • CAVANAUGH (dyspnea on exertion)    • H/O foreign travel 2018    [place]   • Heart murmur    • History of constipation    • History of esophageal spasm    • History of gallstones    • IBS (irritable bowel syndrome)    • Kidney stones    • Melanoma (CMS/HCC) 08/01/2018    x2   • Tattoos      OB History      Para Term  AB Living    3 3 3     3    SAB TAB Ectopic Molar Multiple Live Births              3        Past Surgical History:   Procedure Laterality Date   • BACK SURGERY      Lumbar   • CHOLECYSTECTOMY  2009   • COLONOSCOPY  2009   • HYSTEROSCOPY ENDOMETRIAL ABLATION     •  SALPINGO OOPHORECTOMY  2014   • SKIN CANCER EXCISION  08/01/2018    stage 2 back right shoulder    • TUBAL ABDOMINAL LIGATION       Family History   Problem Relation Age of Onset   • Breast cancer Mother 30   • Breast cancer Maternal Aunt 60   • Colon polyps Father    • Colon polyps Paternal Grandmother    • Heart disease Maternal Grandmother         CABG in her 50s   • Hypertension Other    • Cancer Other    • Diabetes Other    • No Known Problems Daughter    • No Known Problems Son    • No Known Problems Son    • Colon cancer Paternal Great-Grandfather      Social History     Tobacco Use   Smoking Status Never Smoker   Smokeless Tobacco Never Used       Current Outpatient Medications:   •  acyclovir (ZOVIRAX) 400 MG tablet, Take 1 tablet by mouth Daily. Take no more than 5 doses a day., Disp: 90 tablet, Rfl: 1  •  ARMOUR THYROID 90 MG tablet, , Disp: , Rfl:   •  diltiazem (CARDIZEM) 30 MG tablet, One by mouth every 6 hours as needed for esophageal spasm, Disp: 30 tablet, Rfl: 0  •  hyoscyamine (LEVBID) 0.375 MG 12 hr tablet, Take 1 tablet by mouth Every 12 (Twelve) Hours As Needed for Cramping., Disp: 60 tablet, Rfl: 12  •  linaclotide (LINZESS) 72 MCG capsule capsule, Take 1 capsule by mouth Every Morning Before Breakfast., Disp: 30 capsule, Rfl: 11  •  omeprazole (priLOSEC) 40 MG capsule, Take 1 capsule by mouth Daily., Disp: 90 capsule, Rfl: 3  •  predniSONE (DELTASONE) 20 MG tablet, , Disp: , Rfl:   Immunization History   Administered Date(s) Administered   • Flu Vaccine Split Quad 10/29/2018   • Hepatitis A 04/27/2018, 10/29/2018       Review of Systems   Constitutional: Negative for chills, fatigue, fever and unexpected weight change.   Respiratory: Negative for shortness of breath and wheezing.    Cardiovascular: Negative for chest pain.   Gastrointestinal: Positive for diarrhea. Negative for abdominal distention, abdominal pain, blood in stool, constipation and nausea.   Genitourinary: Negative for  difficulty urinating, dyspareunia, dysuria, frequency, hematuria, menstrual problem, pelvic pain, urgency and vaginal discharge.   Skin: Negative for rash.   Psychiatric/Behavioral: The patient is nervous/anxious.        Objective   Physical Exam   Constitutional: She is oriented to person, place, and time. Vital signs are normal. She appears well-developed and well-nourished.   Neck: No thyromegaly present.   Cardiovascular: Normal rate, regular rhythm and normal heart sounds.   Pulmonary/Chest: Effort normal. Right breast exhibits no inverted nipple, no mass, no nipple discharge, no skin change and no tenderness. Left breast exhibits no inverted nipple, no mass, no nipple discharge, no skin change and no tenderness. Breasts are symmetrical. There is no breast swelling.   Abdominal: Soft.   Genitourinary: Vagina normal and uterus normal. No breast tenderness, discharge or bleeding. Pelvic exam was performed with patient supine. No labial fusion. There is no rash, tenderness, lesion or injury on the right labia. There is no rash, tenderness, lesion or injury on the left labia. Cervix exhibits no motion tenderness, no discharge and no friability. Right adnexum displays no mass, no tenderness and no fullness. Left adnexum displays no mass, no tenderness and no fullness.   Neurological: She is alert and oriented to person, place, and time.   Psychiatric: She has a normal mood and affect.   Vitals reviewed.        Assessment/Plan   Nikole was seen today for gynecologic exam.    Diagnoses and all orders for this visit:    Pap smear for cervical cancer screening  -     PapIG, HPV, Rfx 16 / 18    Screening for HPV (human papillomavirus)  -     PapIG, HPV, Rfx 16 / 18    Well woman exam with routine gynecological exam  -     PapIG, HPV, Rfx 16 / 18    Annual physical exam      Overall my exam was normal.  Pap smear done today.  Again colonoscopy next week.  Mammogram is done today.  She still has some menopause symptoms  mention the typical things and I went over the options including hormone therapy or possibly SSRI.  She tried Celexa but did not tolerate that.  She will call me if she would like to try anything.  She is very good with exercise the plan will be back in a year

## 2019-06-06 LAB
CYTOLOGIST CVX/VAG CYTO: ABNORMAL
CYTOLOGY CVX/VAG DOC CYTO: ABNORMAL
CYTOLOGY CVX/VAG DOC THIN PREP: ABNORMAL
DX ICD CODE: ABNORMAL
DX ICD CODE: ABNORMAL
HIV 1 & 2 AB SER-IMP: ABNORMAL
HPV I/H RISK 1 DNA CVX QL PROBE+SIG AMP: NEGATIVE
OTHER STN SPEC: ABNORMAL
PATHOLOGIST CVX/VAG CYTO: ABNORMAL
STAT OF ADQ CVX/VAG CYTO-IMP: ABNORMAL

## 2019-06-10 ENCOUNTER — OUTSIDE FACILITY SERVICE (OUTPATIENT)
Dept: GASTROENTEROLOGY | Facility: CLINIC | Age: 44
End: 2019-06-10

## 2019-06-10 ENCOUNTER — LAB REQUISITION (OUTPATIENT)
Dept: LAB | Facility: HOSPITAL | Age: 44
End: 2019-06-10

## 2019-06-10 DIAGNOSIS — Z12.11 ENCOUNTER FOR SCREENING FOR MALIGNANT NEOPLASM OF COLON: ICD-10-CM

## 2019-06-10 PROCEDURE — 88305 TISSUE EXAM BY PATHOLOGIST: CPT | Performed by: INTERNAL MEDICINE

## 2019-06-10 PROCEDURE — 45380 COLONOSCOPY AND BIOPSY: CPT | Performed by: INTERNAL MEDICINE

## 2019-06-10 PROCEDURE — 43239 EGD BIOPSY SINGLE/MULTIPLE: CPT | Performed by: INTERNAL MEDICINE

## 2019-06-11 LAB
CYTO UR: NORMAL
LAB AP CASE REPORT: NORMAL
LAB AP CLINICAL INFORMATION: NORMAL
PATH REPORT.FINAL DX SPEC: NORMAL
PATH REPORT.GROSS SPEC: NORMAL

## 2019-09-12 ENCOUNTER — OFFICE VISIT (OUTPATIENT)
Dept: GASTROENTEROLOGY | Facility: CLINIC | Age: 44
End: 2019-09-12

## 2019-09-12 VITALS
SYSTOLIC BLOOD PRESSURE: 126 MMHG | BODY MASS INDEX: 24.3 KG/M2 | WEIGHT: 151.2 LBS | HEIGHT: 66 IN | DIASTOLIC BLOOD PRESSURE: 76 MMHG

## 2019-09-12 DIAGNOSIS — Z83.71 FAMILY HISTORY OF POLYPS IN THE COLON: ICD-10-CM

## 2019-09-12 DIAGNOSIS — K21.00 REFLUX ESOPHAGITIS: Primary | ICD-10-CM

## 2019-09-12 DIAGNOSIS — K21.00 GASTROESOPHAGEAL REFLUX DISEASE WITH ESOPHAGITIS: ICD-10-CM

## 2019-09-12 DIAGNOSIS — K22.4 ESOPHAGEAL SPASM: ICD-10-CM

## 2019-09-12 PROCEDURE — 99213 OFFICE O/P EST LOW 20 MIN: CPT | Performed by: INTERNAL MEDICINE

## 2019-09-12 RX ORDER — OMEPRAZOLE 40 MG/1
40 CAPSULE, DELAYED RELEASE ORAL
Qty: 180 CAPSULE | Refills: 3 | Status: SHIPPED | OUTPATIENT
Start: 2019-09-12 | End: 2022-10-25 | Stop reason: SDUPTHER

## 2019-09-12 NOTE — PROGRESS NOTES
"    PATIENT INFORMATION  Nikole Barnes       - 1975    CHIEF COMPLAINT  Chief Complaint   Patient presents with   • Follow-up     2 mo follow up on esphageal spasms       HISTORY OF PRESENT ILLNESS  Had Double in  and had PPI started and has had 2 episodes of spasm since one triggered by ETOH the other was \"out of the Blue\"    Still with Bloating and and alternating constipation/ diarrhea    Gets cramps on Linzess so has been off since prior to her Endoscopy                REVIEW OF SYSTEMS  Review of Systems   Constitutional: Positive for fatigue.   Cardiovascular: Positive for chest pain.   Gastrointestinal: Positive for abdominal distention, abdominal pain, constipation, diarrhea and nausea.        Reflux   Musculoskeletal: Positive for back pain.   Neurological: Positive for light-headedness and headaches.   Psychiatric/Behavioral: Positive for agitation. The patient is nervous/anxious.    All other systems reviewed and are negative.        ACTIVE PROBLEMS  Patient Active Problem List    Diagnosis   • Murmur [R01.1]   • Family history of polyps in the colon [Z83.71]   • Abdominal cramping [R10.9]   • Encounter for screening for malignant neoplasm of colon [Z12.11]   • Malignant melanoma (CMS/HCC) [C43.9]   • Acquired hypothyroidism [E03.9]   • Irritable bowel syndrome with diarrhea [K58.0]   • Menopausal symptoms [N95.1]   • Hypothyroidism due to acquired atrophy of thyroid [E03.4]   • Gastroesophageal reflux disease [K21.9]   • Chest pain [R07.9]   • Diarrhea [R19.7]         PAST MEDICAL HISTORY  Past Medical History:   Diagnosis Date   • Arthritis    • Colon polyp    • Disease of thyroid gland    • CAVANAUGH (dyspnea on exertion)    • H/O foreign travel 2018    [place]   • Heart murmur    • History of constipation    • History of esophageal spasm    • History of gallstones    • IBS (irritable bowel syndrome)    • Kidney stones    • Melanoma (CMS/HCC) 08/01/2018    x2   • Tattoos  "         SURGICAL HISTORY  Past Surgical History:   Procedure Laterality Date   • BACK SURGERY  2008    Lumbar   • CHOLECYSTECTOMY  2009   • COLONOSCOPY  2009   • HYSTEROSCOPY ENDOMETRIAL ABLATION     • SALPINGO OOPHORECTOMY  2014   • SKIN CANCER EXCISION  08/01/2018    stage 2 back right shoulder    • TUBAL ABDOMINAL LIGATION           FAMILY HISTORY  Family History   Problem Relation Age of Onset   • Breast cancer Mother 30   • Breast cancer Maternal Aunt 60   • Colon polyps Father    • Colon polyps Paternal Grandmother    • Heart disease Maternal Grandmother         CABG in her 50s   • Hypertension Other    • Cancer Other    • Diabetes Other    • No Known Problems Daughter    • No Known Problems Son    • No Known Problems Son    • Colon cancer Paternal Great-Grandfather          SOCIAL HISTORY  Social History     Occupational History   • 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 Take no more than 5 doses a day., Disp: 90 tablet, Rfl: 1  •  ARMOUR THYROID 90 MG tablet, , Disp: , Rfl:   •  diltiazem (CARDIZEM) 30 MG tablet, One by mouth every 6 hours as needed for esophageal spasm, Disp: 30 tablet, Rfl: 0  •  hyoscyamine (LEVBID) 0.375 MG 12 hr tablet, Take 1 tablet by mouth Every 12 (Twelve) Hours As Needed for Cramping., Disp: 60 tablet, Rfl: 12  •  linaclotide (LINZESS) 72 MCG capsule capsule, Take 1 capsule by mouth Every Morning Before Breakfast., Disp: 30 capsule, Rfl: 11  •  omeprazole (priLOSEC) 40 MG capsule, Take 1 capsule by mouth  "Daily., Disp: 90 capsule, Rfl: 3  •  omeprazole (priLOSEC) 40 MG capsule, Take 1 capsule by mouth 2 (Two) Times a Day Before Meals., Disp: 180 capsule, Rfl: 3  •  predniSONE (DELTASONE) 20 MG tablet, , Disp: , Rfl:     ALLERGIES  Align [acidophilus]    VITALS  Vitals:    09/12/19 0925   BP: 126/76   Weight: 68.6 kg (151 lb 3.2 oz)   Height: 167 cm (65.75\")       LAST RESULTS   Lab Requisition on 06/10/2019   Component Date Value Ref Range Status   • Case Report 06/10/2019    Final                    Value:Surgical Pathology Report                         Case: [id number]                                  Authorizing Provider:  Angelo Priest        Collected:           06/10/2019 09:50 AM                                 MD Wayne                                                                   Pathologist:           April Biswas MD  Received:            06/10/2019 03:27 PM          Specimens:   1) - Gastric, 1) gastric bxs                                                                        2) - Esophagus, 2) esophageal bxs                                                                   3) - Large Intestine, Rectum, 3) rectal polyp                                             • Clinical Information 06/10/2019    Final                    Value:This result contains rich text formatting which cannot be displayed here.   • Final Diagnosis 06/10/2019    Final                    Value:This result contains rich text formatting which cannot be displayed here.   • Gross Description 06/10/2019    Final                    Value:This result contains rich text formatting which cannot be displayed here.   • Microscopic Description 06/10/2019    Final                    Value:This result contains rich text formatting which cannot be displayed here.     No results found.    PHYSICAL EXAM  Physical Exam   Constitutional: She is oriented to person, place, and time. She appears well-developed and well-nourished. "   HENT:   Head: Normocephalic and atraumatic.   Eyes: Conjunctivae and EOM are normal. Pupils are equal, round, and reactive to light. No scleral icterus.   Neck: Normal range of motion. Neck supple. No thyromegaly present.   Cardiovascular: Normal rate, regular rhythm, normal heart sounds and intact distal pulses. Exam reveals no gallop.   No murmur heard.  Pulmonary/Chest: Effort normal and breath sounds normal. She has no wheezes. She has no rales.   Abdominal: Soft. Bowel sounds are normal. She exhibits no shifting dullness, no distension, no fluid wave, no abdominal bruit, no ascites and no mass. There is no hepatosplenomegaly. There is tenderness in the right upper quadrant and epigastric area. There is no guarding and negative Rojo's sign. Hernia confirmed negative in the ventral area.   Musculoskeletal: Normal range of motion. She exhibits no edema.   Lymphadenopathy:     She has no cervical adenopathy.   Neurological: She is alert and oriented to person, place, and time.   Skin: Skin is warm and dry. No rash noted. She is not diaphoretic. No erythema.   Psychiatric: She has a normal mood and affect.       ASSESSMENT  Diagnoses and all orders for this visit:    Reflux esophagitis  -     NM Gastric Emptying; Future    Gastroesophageal reflux disease with esophagitis    Family history of polyps in the colon    Esophageal spasm    Other orders  -     omeprazole (priLOSEC) 40 MG capsule; Take 1 capsule by mouth 2 (Two) Times a Day Before Meals.          PLAN  Will increase her PPI and check a GES  Also has PRN Diltiazem  No Follow-up on file.

## 2019-09-20 ENCOUNTER — HOSPITAL ENCOUNTER (OUTPATIENT)
Dept: NUCLEAR MEDICINE | Facility: HOSPITAL | Age: 44
Discharge: HOME | End: 2019-09-20

## 2019-09-20 DIAGNOSIS — K21.00 REFLUX ESOPHAGITIS: ICD-10-CM

## 2019-09-20 PROCEDURE — 0 TECHNETIUM SULFUR COLLOID: Performed by: INTERNAL MEDICINE

## 2019-09-20 PROCEDURE — A9541 TC99M SULFUR COLLOID: HCPCS | Performed by: INTERNAL MEDICINE

## 2019-09-20 PROCEDURE — 78264 GASTRIC EMPTYING IMG STUDY: CPT

## 2019-09-20 RX ADMIN — TECHNETIUM TC 99M SULFUR COLLOID 1 DOSE: KIT at 07:40

## 2020-06-10 ENCOUNTER — OFFICE VISIT (OUTPATIENT)
Dept: OBSTETRICS AND GYNECOLOGY | Age: 45
End: 2020-06-10

## 2020-06-10 VITALS
HEIGHT: 66 IN | DIASTOLIC BLOOD PRESSURE: 68 MMHG | SYSTOLIC BLOOD PRESSURE: 122 MMHG | WEIGHT: 166 LBS | BODY MASS INDEX: 26.68 KG/M2

## 2020-06-10 DIAGNOSIS — Z00.00 ANNUAL PHYSICAL EXAM: ICD-10-CM

## 2020-06-10 DIAGNOSIS — Z11.51 SPECIAL SCREENING EXAMINATION FOR HUMAN PAPILLOMAVIRUS (HPV): ICD-10-CM

## 2020-06-10 DIAGNOSIS — Z20.2 EXPOSURE TO STD: ICD-10-CM

## 2020-06-10 DIAGNOSIS — Z12.39 BREAST SCREENING: ICD-10-CM

## 2020-06-10 DIAGNOSIS — N95.1 MENOPAUSAL SYMPTOMS: ICD-10-CM

## 2020-06-10 DIAGNOSIS — Z12.4 ROUTINE CERVICAL SMEAR: Primary | ICD-10-CM

## 2020-06-10 DIAGNOSIS — K58.0 IRRITABLE BOWEL SYNDROME WITH DIARRHEA: ICD-10-CM

## 2020-06-10 PROBLEM — M79.7 FIBROMYALGIA: Status: ACTIVE | Noted: 2020-03-06

## 2020-06-10 PROBLEM — K22.4 DIFFUSE SPASM OF ESOPHAGUS: Status: ACTIVE | Noted: 2020-01-15

## 2020-06-10 PROCEDURE — 99396 PREV VISIT EST AGE 40-64: CPT | Performed by: OBSTETRICS & GYNECOLOGY

## 2020-06-10 RX ORDER — DULOXETIN HYDROCHLORIDE 30 MG/1
CAPSULE, DELAYED RELEASE ORAL
COMMUNITY
Start: 2020-06-03 | End: 2022-10-25

## 2020-06-10 NOTE — PROGRESS NOTES
Subjective   Nikole Barnes is a 44 y.o. female is being seen today for   Chief Complaint   Patient presents with   • Gynecologic Exam     pap 2019, MG 2019, C-scope 2019   .    History of Present Illness  Patient is here for annual exam.  Her children doing well nothing new to report in the family.  Bowels are the same still has her IBS goes up and down as it typically does.  Obviously we talked but that in terms of stress and she is under more stress right now.  Home situation has never been great for a while and is a little bit of a struggle right now at home.  We talked about different X affairs and exposure.  She is feeling more emotional now and does know if it is menopause or not she is on Cymbalta 30 so after discussion we elected to go ahead and check some blood work including FSH and I want her to take an extra Cymbalta every the day for 2 weeks then call me back let me know how she is doing.  More than willing to try hormones at some point for now I think is more emotional than just the hormone factor.  No other complaints today  The following portions of the patient's history were reviewed and updated as appropriate: allergies, current medications, past family history, past medical history, past social history, past surgical history and problem list.    Vitals:    06/10/20 1057   BP: 122/68       PAST MEDICAL HISTORY  Past Medical History:   Diagnosis Date   • Arthritis    • Colon polyp    • Disease of thyroid gland    • CAVANAUGH (dyspnea on exertion)    • H/O foreign travel 2018    [place]   • Heart murmur    • History of constipation    • History of esophageal spasm    • History of gallstones    • IBS (irritable bowel syndrome)    • Kidney stones    • Melanoma (CMS/HCC) 08/01/2018    x2   • Tattoos      OB History        3    Para   3    Term   3            AB        Living   3       SAB        TAB        Ectopic        Molar        Multiple        Live Births   3              Past  Surgical History:   Procedure Laterality Date   • BACK SURGERY  2008    Lumbar   • CHOLECYSTECTOMY  2009   • COLONOSCOPY  2009   • HYSTEROSCOPY ENDOMETRIAL ABLATION     • SALPINGO OOPHORECTOMY  2014   • SKIN CANCER EXCISION  08/01/2018    stage 2 back right shoulder    • TUBAL ABDOMINAL LIGATION       Family History   Problem Relation Age of Onset   • Breast cancer Mother 30   • Breast cancer Maternal Aunt 60   • Colon polyps Father    • Colon polyps Paternal Grandmother    • Heart disease Maternal Grandmother         CABG in her 50s   • Hypertension Other    • Cancer Other    • Diabetes Other    • No Known Problems Daughter    • No Known Problems Son    • No Known Problems Son    • Colon cancer Paternal Great-Grandfather      Social History     Tobacco Use   Smoking Status Never Smoker   Smokeless Tobacco Never Used       Current Outpatient Medications:   •  ARMOUR THYROID 90 MG tablet, , Disp: , Rfl:   •  diltiaZEM (CARDIZEM) 30 MG tablet, One by mouth every 6 hours as needed for esophageal spasm, Disp: 60 tablet, Rfl: 11  •  DULoxetine (CYMBALTA) 30 MG capsule, , Disp: , Rfl:   •  acyclovir (ZOVIRAX) 400 MG tablet, Take 1 tablet by mouth Daily. Take no more than 5 doses a day., Disp: 90 tablet, Rfl: 1  •  hyoscyamine (LEVBID) 0.375 MG 12 hr tablet, Take 1 tablet by mouth Every 12 (Twelve) Hours As Needed for Cramping., Disp: 60 tablet, Rfl: 12  •  linaclotide (LINZESS) 72 MCG capsule capsule, Take 1 capsule by mouth Every Morning Before Breakfast., Disp: 30 capsule, Rfl: 11  •  omeprazole (priLOSEC) 40 MG capsule, Take 1 capsule by mouth 2 (Two) Times a Day Before Meals., Disp: 180 capsule, Rfl: 3  •  predniSONE (DELTASONE) 20 MG tablet, , Disp: , Rfl:   Immunization History   Administered Date(s) Administered   • Flu Vaccine Split Quad 10/29/2018   • Hepatitis A 04/27/2018, 10/29/2018       Review of Systems   Constitutional: Negative for chills, fatigue, fever and unexpected weight change.   Respiratory:  Negative for shortness of breath and wheezing.    Cardiovascular: Negative for chest pain.   Gastrointestinal: Positive for diarrhea. Negative for abdominal distention, abdominal pain, blood in stool, constipation and nausea.   Genitourinary: Negative for difficulty urinating, dyspareunia, dysuria, frequency, hematuria, menstrual problem, pelvic pain, urgency and vaginal discharge.   Skin: Negative for rash.   Psychiatric/Behavioral: The patient is nervous/anxious.        Objective   Physical Exam   Constitutional: She is oriented to person, place, and time. Vital signs are normal. She appears well-developed and well-nourished.   Neck: No thyromegaly present.   Cardiovascular: Normal rate, regular rhythm and normal heart sounds.   Pulmonary/Chest: Effort normal. Right breast exhibits no inverted nipple, no mass, no nipple discharge, no skin change and no tenderness. Left breast exhibits no inverted nipple, no mass, no nipple discharge, no skin change and no tenderness. No breast swelling, tenderness, discharge or bleeding. Breasts are symmetrical.   Abdominal: Soft.   Genitourinary: Vagina normal and uterus normal. No breast swelling, tenderness, discharge or bleeding. Pelvic exam was performed with patient supine. No labial fusion. There is no rash, tenderness, lesion or injury on the right labia. There is no rash, tenderness, lesion or injury on the left labia. Cervix exhibits no motion tenderness, no discharge and no friability. Right adnexum displays no mass, no tenderness and no fullness. Left adnexum displays no mass, no tenderness and no fullness.   Neurological: She is alert and oriented to person, place, and time.   Psychiatric: She has a normal mood and affect.   Vitals reviewed.        Assessment/Plan   Nikole was seen today for gynecologic exam.    Diagnoses and all orders for this visit:    Routine cervical smear  -     Pap IG, Ct-Ng, Rfx HPV All    Special screening examination for human  papillomavirus (HPV)  -     Pap IG, Ct-Ng, Rfx HPV All    Exposure to STD  -     Pap IG, Ct-Ng, Rfx HPV All  -     Hepatitis B Surface Antigen  -     HIV-1 / O / 2 Ag / Antibody 4th Generation  -     RPR  -     Follicle Stimulating Hormone    Annual physical exam    Menopausal symptoms    Irritable bowel syndrome with diarrhea        Overall exam was normal.  So I did some testing for STD exposure..  Reviewed the Pap last year was ASCUS negative HPV.  We will get a mammogram up in [place].  Not ready for bone density quite yet.  Colonoscopy up-to-date.  So we talked about exercise and diet in general matter.  Back in a year but will be discussing her emotional status in the meantime.

## 2020-06-11 ENCOUNTER — TELEPHONE (OUTPATIENT)
Dept: OBSTETRICS AND GYNECOLOGY | Age: 45
End: 2020-06-11

## 2020-06-11 LAB
FSH SERPL-ACNC: 37.8 MIU/ML
HBV SURFACE AG SERPL QL IA: NEGATIVE
HIV 1+2 AB+HIV1 P24 AG SERPL QL IA: NON REACTIVE
RPR SER QL: NORMAL

## 2020-06-11 NOTE — TELEPHONE ENCOUNTER
----- Message from Molina Choudhary MD sent at 6/11/2020  8:33 AM EDT -----  Tell patient the blood portion of the testing was negative and she is definitely in menopause but is probably not totally done.

## 2020-06-15 LAB
C TRACH RRNA CVX QL NAA+PROBE: NEGATIVE
CONV .: NORMAL
CYTOLOGIST CVX/VAG CYTO: NORMAL
CYTOLOGY CVX/VAG DOC CYTO: NORMAL
CYTOLOGY CVX/VAG DOC THIN PREP: NORMAL
DX ICD CODE: NORMAL
HIV 1 & 2 AB SER-IMP: NORMAL
Lab: NORMAL
N GONORRHOEA RRNA CVX QL NAA+PROBE: NEGATIVE
OTHER STN SPEC: NORMAL
STAT OF ADQ CVX/VAG CYTO-IMP: NORMAL

## 2020-06-16 ENCOUNTER — TELEPHONE (OUTPATIENT)
Dept: OBSTETRICS AND GYNECOLOGY | Age: 45
End: 2020-06-16

## 2020-06-16 NOTE — TELEPHONE ENCOUNTER
----- Message from Molina Choudhary MD sent at 6/16/2020  8:14 AM EDT -----  Tell patient Pap and all testing negative

## 2020-07-22 ENCOUNTER — TELEPHONE (OUTPATIENT)
Dept: OBSTETRICS AND GYNECOLOGY | Age: 45
End: 2020-07-22

## 2020-07-22 NOTE — TELEPHONE ENCOUNTER
Patient is aware you are out of office until 7/24/20  Former Dr Choudhary pt is calling:  LOV 6/10/20, and says he told her he would leave a recommended medication for menopausal symptoms. Does this patient need a New GYN appt or can I put her in a f/u slot to see you (first time)?  Please advise

## 2020-07-29 ENCOUNTER — OFFICE VISIT (OUTPATIENT)
Dept: OBSTETRICS AND GYNECOLOGY | Age: 45
End: 2020-07-29

## 2020-07-29 VITALS
DIASTOLIC BLOOD PRESSURE: 80 MMHG | SYSTOLIC BLOOD PRESSURE: 130 MMHG | BODY MASS INDEX: 27.03 KG/M2 | HEIGHT: 66 IN | WEIGHT: 168.2 LBS

## 2020-07-29 DIAGNOSIS — F32.A DEPRESSION, UNSPECIFIED DEPRESSION TYPE: ICD-10-CM

## 2020-07-29 DIAGNOSIS — N95.1 MENOPAUSAL SYMPTOMS: Primary | ICD-10-CM

## 2020-07-29 DIAGNOSIS — Z12.39 SCREENING FOR BREAST CANCER: ICD-10-CM

## 2020-07-29 PROCEDURE — 99214 OFFICE O/P EST MOD 30 MIN: CPT | Performed by: OBSTETRICS & GYNECOLOGY

## 2020-07-29 RX ORDER — VIT C/B6/B5/MAGNESIUM/HERB 173 50-5-6-5MG
CAPSULE ORAL
COMMUNITY
End: 2022-10-25

## 2020-07-29 NOTE — PROGRESS NOTES
"Chief complaint:menopause symptooms    HPI  Nikole Barnes is a 44 y.o. female presents for discussion regarding menopause symptoms. She has not has menses since she was 31 yo after endometrial ablation. She recently had annual with Dr Choudhary and he did blood work and reported she was in menopause. She has a few hot flashes a day. She denies vaginal dryness. She has some hot flashes at night but not regularly. She has some sleep disturbance but reports this is chronic. She reports her worse symptom is irritability and mood disturbance. She denies homicidal ideation but has considered suicide. She does not have a plan. She is trying to work through things with her spouse. She is tearful at times. She continues to work at [employer] and notes worse stress with covid. She also notes a significant family hx of depression.        The following portions of the patient's history were reviewed and updated as appropriate: allergies, current medications, past family history, past medical history, past social history, past surgical history and problem list.    Review of Systems  Constitutional: positive for weight gain  Respiratory: negative for cough  Genitourinary:negative for vaginal dryness  Behavioral/Psych: positive for irritability  Endocrine: positive for temperature intolerance    /80   Ht 167.6 cm (66\")   Wt 76.3 kg (168 lb 3.2 oz)   LMP  (LMP Unknown) Comment: Ablation  Breastfeeding No   BMI 27.15 kg/m²         Physical Exam   Constitutional: She is oriented to person, place, and time. She appears well-developed and well-nourished.   Pulmonary/Chest: Effort normal.   Neurological: She is alert and oriented to person, place, and time.   Psychiatric: She has a normal mood and affect. Her behavior is normal. Thought content normal.           Nikole was seen today for follow-up.    Diagnoses and all orders for this visit:    Menopausal symptoms  -     Ambulatory Referral to Psychiatry    Screening for breast " cancer  -     Mammo Screening Digital Tomosynthesis Bilateral With CAD; Future    Depression, unspecified depression type  -     Ambulatory Referral to Psychiatry      Had a lengthy conversation with pt. Her primary symptoms are mood and not estrogen withdrawal. She is taking cymbalta but this does not seem to address her recent worsening stress and depression. She agrees to seek immediate emergency assistance for recurrent suicidal thoughts. She will contact her primary MD about altering her current medication. Referred to psychiatrist for additional assistance particularly given significant family hx of mood disorder. Discussed that estrogen would provide some assistance with hot flashes but this does not seem to be primary issue. Pt has a significant family hx of breast cancer so would try to avoid HRT at this time. Pt notes understanding.     She reports she has previously had negative genetic testing with Dr. Choudhary. She does not have any contact with her mother or aunt to know if they have been tested. She is due for mammogram. Recommend she schedule with tomosynthesis and order placed.    Plan phone visit in 2 weeks or as needed.

## 2020-08-03 ENCOUNTER — OFFICE VISIT (OUTPATIENT)
Dept: OBSTETRICS AND GYNECOLOGY | Age: 45
End: 2020-08-03

## 2020-08-03 DIAGNOSIS — F32.9 MAJOR DEPRESSIVE DISORDER WITH CURRENT ACTIVE EPISODE, UNSPECIFIED DEPRESSION EPISODE SEVERITY, UNSPECIFIED WHETHER RECURRENT: Primary | ICD-10-CM

## 2020-08-03 PROCEDURE — 99441 PR PHYS/QHP TELEPHONE EVALUATION 5-10 MIN: CPT | Performed by: OBSTETRICS & GYNECOLOGY

## 2020-08-03 NOTE — PROGRESS NOTES
Chief complaint:  Depression    HPI  Nikole Barnes is a 44 y.o. female is contacted for a phone visit regarding depressed mood and stress. She notes she was contacted by the behavioral health office but has not called back to book yet. She has also not had time to contact her primary MD but plans to reach out. She denies any worsening of her mood. She reports a lot of her mood issues are related to how she and spouse are getting along. She reports she remains stressed with depressed mood but does not feel her symptoms have worsened since her appt.   .You have chosen to receive care through a telephone visit. Do you consent to use a telephone visit for your medical care today? Yes.         The following portions of the patient's history were reviewed and updated as appropriate: allergies, current medications, past family history, past medical history, past social history, past surgical history and problem list.    Review of Systems  Pertinent items are noted in HPI.    LMP  (LMP Unknown) Comment: Ablation        Physical Exam  No physical, phone visit      Diagnoses and all orders for this visit:    Major depressive disorder with current active episode, unspecified depression episode severity, unspecified whether recurrent      Pt with ongoing mood issues. She has been contacted by behavioral health office but has not called them back to book. Encouraged her to move forward and recommend she consider therapy as well to assist with issues with spouse. She will consider. She denies any worsening of her mood. She agrees to call or proceed to ER if she does not feel stable. Encouraged her to book with behavioral health MD asap. Advised I could provide work note/excuse to allow her to take time for appt. She reports she will reach out if this is needed. She agrees to contact me as needed.     5 minutes spent on phone with pt

## 2020-09-02 ENCOUNTER — APPOINTMENT (OUTPATIENT)
Dept: WOMENS IMAGING | Facility: HOSPITAL | Age: 45
End: 2020-09-02

## 2020-09-02 PROCEDURE — 77067 SCR MAMMO BI INCL CAD: CPT | Performed by: RADIOLOGY

## 2020-09-02 PROCEDURE — 77063 BREAST TOMOSYNTHESIS BI: CPT | Performed by: RADIOLOGY

## 2021-11-16 ENCOUNTER — OFFICE VISIT (OUTPATIENT)
Dept: OBSTETRICS AND GYNECOLOGY | Age: 46
End: 2021-11-16

## 2021-11-16 VITALS
WEIGHT: 179 LBS | HEIGHT: 66 IN | BODY MASS INDEX: 28.77 KG/M2 | DIASTOLIC BLOOD PRESSURE: 68 MMHG | SYSTOLIC BLOOD PRESSURE: 134 MMHG

## 2021-11-16 DIAGNOSIS — Z11.51 ENCOUNTER FOR SCREENING FOR HUMAN PAPILLOMAVIRUS (HPV): ICD-10-CM

## 2021-11-16 DIAGNOSIS — Z80.3 FAMILY HISTORY OF BREAST CANCER: ICD-10-CM

## 2021-11-16 DIAGNOSIS — Z01.419 ENCOUNTER FOR GYNECOLOGICAL EXAMINATION: Primary | ICD-10-CM

## 2021-11-16 DIAGNOSIS — Z12.31 ENCOUNTER FOR SCREENING MAMMOGRAM FOR MALIGNANT NEOPLASM OF BREAST: ICD-10-CM

## 2021-11-16 PROCEDURE — 99396 PREV VISIT EST AGE 40-64: CPT | Performed by: OBSTETRICS & GYNECOLOGY

## 2021-11-16 RX ORDER — ESCITALOPRAM OXALATE 20 MG/1
TABLET ORAL
COMMUNITY

## 2021-11-16 RX ORDER — VALACYCLOVIR HYDROCHLORIDE 1 G/1
TABLET, FILM COATED ORAL
COMMUNITY
End: 2022-10-25

## 2021-11-16 RX ORDER — FLUTICASONE PROPIONATE 50 MCG
SPRAY, SUSPENSION (ML) NASAL
COMMUNITY
End: 2022-10-25

## 2021-11-16 NOTE — PROGRESS NOTES
".  Subjective     Chief Complaint   Patient presents with   • Gynecologic Exam     AE today, Last pap 06/10/2020 nml, Mg 2020, Colonoscopy 06/10/2019, No problems       History of Present Illness    Nikole Barnes is a 46 y.o.  who presents for annual exam.  Denies vaginal bleeding or pelvic pain  She has noted some weight gain since dx of menopause.     Obstetric History:  OB History        3    Para   3    Term   3            AB        Living   3       SAB        IAB        Ectopic        Molar        Multiple        Live Births   3               Menstrual History:     No LMP recorded (lmp unknown). Patient has had an ablation.         Current contraception: tubal ligation  History of abnormal Pap smear: no  Received Gardasil immunization: no  Perform regular self breast exam: occ  Family history of uterine or ovarian cancer: no  Family History of colon cancer: yes - Pat [employer] GF  Family history of breast cancer: yes - mother, mat aunt, mother at age 30    Mammogram: ordered.  Colonoscopy: up to date, done  and due in 5 years  DEXA: not indicated.    Exercise: moderately active  Calcium/Vitamin D: adequate intake    The following portions of the patient's history were reviewed and updated as appropriate: allergies, current medications, past family history, past medical history, past social history, past surgical history and problem list.    Review of Systems    Review of Systems   Constitutional: Negative for fatigue.   Respiratory: Negative for shortness of breath.    Gastrointestinal: Negative for abdominal pain.   Genitourinary: Negative for vaginal bleeding or pelvic pain.   Neurological: Negative for headaches.   Psychiatric/Behavioral: Negative for dysphoric mood.         Objective   Physical Exam    /68   Ht 167.6 cm (66\")   Wt 81.2 kg (179 lb)   LMP  (LMP Unknown)   Breastfeeding No   BMI 28.89 kg/m²   General:   alert and no distress   Heart: regular rate and " rhythm   Lungs: clear to auscultation bilaterally   Breast: Inspection negative; no masses, retractions, nipple discharge or axillary adenopathy   Neck: Supple, no thyromegaly   Abdomen: soft, non-tender. No masses,  no organomegaly   Pelvis: External genitalia: normal general appearance  Urinary system: urethral meatus normal  Vaginal: normal mucosa without prolapse or lesions  Cervix: normal appearance  Adnexa: no masses or tenderness  Uterus: normal, nontender   Extremities: Extremities normal, atraumatic, no cyanosis or edema   Neurologic: Alert and oriented   Psychiatric: Normal affect, judgement, and mood     Assessment/Plan   Diagnoses and all orders for this visit:    1. Encounter for gynecological examination (Primary)  -     IGP,CtNg,AptimaHPV,rfx16 / 18,45    2. Encounter for screening for human papillomavirus (HPV)  -     IGP,CtNg,AptimaHPV,rfx16 / 18,45    3. Encounter for screening mammogram for malignant neoplasm of breast  -     Mammo Screening Digital Tomosynthesis Bilateral With CAD; Future    4. Family history of breast cancer        All questions answered.  Breast self exam technique reviewed and patient encouraged to perform self-exam monthly.  Discussed healthy lifestyle modifications.  Recommended 30 minutes of aerobic exercise five times per week.  Discussed calcium/ Vit D needs to prevent osteoporosis.  Pt requests STD screen with pap, declines blood testing, advised her to call if she does not receive results within 2 weeks  Recommend pt book mammogram asap    Recommend my risk testing as pt only had BRCA in past and pt agrees. Labs drawn today, recommend she return in 3 weeks to review. Pt does not have contact with her mother or aunt.

## 2021-11-21 LAB
C TRACH RRNA CVX QL NAA+PROBE: NEGATIVE
CYTOLOGIST CVX/VAG CYTO: NORMAL
CYTOLOGY CVX/VAG DOC CYTO: NORMAL
CYTOLOGY CVX/VAG DOC THIN PREP: NORMAL
DX ICD CODE: NORMAL
HIV 1 & 2 AB SER-IMP: NORMAL
HPV I/H RISK 4 DNA CVX QL PROBE+SIG AMP: NEGATIVE
N GONORRHOEA RRNA CVX QL NAA+PROBE: NEGATIVE
OTHER STN SPEC: NORMAL
STAT OF ADQ CVX/VAG CYTO-IMP: NORMAL

## 2021-12-06 PROBLEM — Z80.3 FAMILY HISTORY OF BREAST CANCER: Status: ACTIVE | Noted: 2021-12-06

## 2021-12-08 ENCOUNTER — TELEPHONE (OUTPATIENT)
Dept: MAMMOGRAPHY | Facility: CLINIC | Age: 46
End: 2021-12-08

## 2021-12-08 ENCOUNTER — TELEPHONE (OUTPATIENT)
Dept: OBSTETRICS AND GYNECOLOGY | Age: 46
End: 2021-12-08

## 2021-12-08 DIAGNOSIS — Z91.89 AT HIGH RISK FOR BREAST CANCER: Primary | ICD-10-CM

## 2021-12-08 NOTE — TELEPHONE ENCOUNTER
LEYDI for patient to call back about referral from Dr. Schneider. She will need to see Dr. Jay since she has a family hx of breast cx. This is our first attempt to reach the patient.

## 2021-12-08 NOTE — TELEPHONE ENCOUNTER
Called pt and reviewed her genetic results. Advised she was negative for clinically significant mutations or VUS. She is at an increased lifetime risk due to family hx. Discussed that increased surveillance is indicated. Recommend yearly MRI staggered 6 months from mammogram and consult with high risk breast clinic. Pt agrees. Orders placed. Advised pt to call if she does not receive results of her breat imaging wtihin 2 weeks of testing. She has scheduled her mammogram later this month. Advised her to call if she does not receive contact to book consult. 5

## 2021-12-09 ENCOUNTER — TELEPHONE (OUTPATIENT)
Dept: OBSTETRICS AND GYNECOLOGY | Age: 46
End: 2021-12-09

## 2021-12-14 ENCOUNTER — TRANSCRIBE ORDERS (OUTPATIENT)
Dept: ADMINISTRATIVE | Facility: HOSPITAL | Age: 46
End: 2021-12-14

## 2021-12-14 DIAGNOSIS — U07.1 CLINICAL DIAGNOSIS OF COVID-19: Primary | ICD-10-CM

## 2021-12-16 ENCOUNTER — HOSPITAL ENCOUNTER (OUTPATIENT)
Dept: INFUSION THERAPY | Facility: HOSPITAL | Age: 46
Discharge: HOME | End: 2021-12-16
Admitting: NURSE PRACTITIONER

## 2021-12-16 VITALS
HEART RATE: 86 BPM | SYSTOLIC BLOOD PRESSURE: 123 MMHG | RESPIRATION RATE: 18 BRPM | DIASTOLIC BLOOD PRESSURE: 77 MMHG | OXYGEN SATURATION: 96 % | TEMPERATURE: 101 F

## 2021-12-16 PROCEDURE — M0243 CASIRIVI AND IMDEVI INFUSION: HCPCS | Performed by: NURSE PRACTITIONER

## 2021-12-16 PROCEDURE — 25010000002 INJECTION, CASIRIVIMAB AND IMDEVIMAB, 1200 MG: Performed by: NURSE PRACTITIONER

## 2021-12-16 RX ORDER — DIPHENHYDRAMINE HYDROCHLORIDE 50 MG/ML
50 INJECTION INTRAMUSCULAR; INTRAVENOUS ONCE AS NEEDED
Status: DISCONTINUED | OUTPATIENT
Start: 2021-12-16 | End: 2021-12-16

## 2021-12-16 RX ORDER — DIPHENHYDRAMINE HCL 25 MG
50 CAPSULE ORAL ONCE AS NEEDED
Status: DISCONTINUED | OUTPATIENT
Start: 2021-12-16 | End: 2021-12-18 | Stop reason: HOSPADM

## 2021-12-16 RX ORDER — DIPHENHYDRAMINE HCL 50 MG
50 CAPSULE ORAL ONCE AS NEEDED
Status: DISCONTINUED | OUTPATIENT
Start: 2021-12-16 | End: 2021-12-16

## 2021-12-16 RX ORDER — DIPHENHYDRAMINE HYDROCHLORIDE 50 MG/ML
50 INJECTION INTRAMUSCULAR; INTRAVENOUS ONCE AS NEEDED
Status: DISCONTINUED | OUTPATIENT
Start: 2021-12-16 | End: 2021-12-18 | Stop reason: HOSPADM

## 2021-12-16 RX ADMIN — IMDEVIMAB: 300 INJECTION, SOLUTION, CONCENTRATE INTRAVENOUS at 08:34

## 2021-12-16 NOTE — NURSING NOTE
"Pt arrived to [facility] for SQ regeneron per MD order. Pt given handout titled, \"Fact Sheet for Patients, Parents and Caregivers: Emergency Use Authorization of Regen-cov\" prior to administration of SQ regeneron. RN educated pt on injection process, to not rcv any covid vaccine for 90 days, to go to ER for any issues with worsening breathing and to call PCP if symptoms are not improving. Pt vu. Pt denies any questions at this time.     MD order located in media tab.    Regeneron given x4 injections consecutively, each at a different injection sites. See MAR for additional administration information.  Pt instructed to remain in room for 1 hour for post monitoring period. Call light within reach.    1004- Post monitoring complete. VS obtained and documented. No change in pt assessment. AVS given to pt. Pt escorted to private entrance and discharged in stable condition.     "

## 2021-12-21 ENCOUNTER — TELEPHONE (OUTPATIENT)
Dept: MAMMOGRAPHY | Facility: CLINIC | Age: 46
End: 2021-12-21

## 2021-12-21 NOTE — TELEPHONE ENCOUNTER
LEYDI for patient to call back about referral from Dr. Schneider. She will need to see Dr. Jay since she has a family hx of breast cx. This is our 2nd attempt to reach the patient.

## 2021-12-22 ENCOUNTER — HOSPITAL ENCOUNTER (OUTPATIENT)
Dept: MAMMOGRAPHY | Facility: HOSPITAL | Age: 46
Discharge: HOME | End: 2021-12-22
Admitting: OBSTETRICS & GYNECOLOGY

## 2021-12-22 DIAGNOSIS — Z12.31 ENCOUNTER FOR SCREENING MAMMOGRAM FOR MALIGNANT NEOPLASM OF BREAST: ICD-10-CM

## 2021-12-22 PROCEDURE — 77067 SCR MAMMO BI INCL CAD: CPT

## 2021-12-22 PROCEDURE — 77063 BREAST TOMOSYNTHESIS BI: CPT

## 2022-02-10 ENCOUNTER — HOSPITAL ENCOUNTER (EMERGENCY)
Facility: HOSPITAL | Age: 47
Discharge: HOME | End: 2022-02-10
Attending: EMERGENCY MEDICINE | Admitting: EMERGENCY MEDICINE

## 2022-02-10 ENCOUNTER — APPOINTMENT (OUTPATIENT)
Dept: CT IMAGING | Facility: HOSPITAL | Age: 47
End: 2022-02-10

## 2022-02-10 VITALS
BODY MASS INDEX: 25.18 KG/M2 | HEART RATE: 60 BPM | RESPIRATION RATE: 16 BRPM | OXYGEN SATURATION: 96 % | WEIGHT: 170 LBS | HEIGHT: 69 IN | TEMPERATURE: 98.1 F | DIASTOLIC BLOOD PRESSURE: 67 MMHG | SYSTOLIC BLOOD PRESSURE: 128 MMHG

## 2022-02-10 DIAGNOSIS — N20.1 URETEROLITHIASIS: Primary | ICD-10-CM

## 2022-02-10 LAB
ANION GAP SERPL CALCULATED.3IONS-SCNC: 13.1 MMOL/L (ref 5–15)
BACTERIA UR QL AUTO: ABNORMAL /HPF
BASOPHILS # BLD AUTO: 0.02 10*3/MM3 (ref 0–0.2)
BASOPHILS NFR BLD AUTO: 0.2 % (ref 0–1.5)
BILIRUB UR QL STRIP: NEGATIVE
BUN SERPL-MCNC: 15 MG/DL (ref 6–20)
BUN/CREAT SERPL: 15.8 (ref 7–25)
CALCIUM SPEC-SCNC: 10.1 MG/DL (ref 8.6–10.5)
CHLORIDE SERPL-SCNC: 100 MMOL/L (ref 98–107)
CLARITY UR: CLEAR
CO2 SERPL-SCNC: 21.9 MMOL/L (ref 22–29)
COLOR UR: YELLOW
CREAT SERPL-MCNC: 0.95 MG/DL (ref 0.57–1)
DEPRECATED RDW RBC AUTO: 39.9 FL (ref 37–54)
EOSINOPHIL # BLD AUTO: 0 10*3/MM3 (ref 0–0.4)
EOSINOPHIL NFR BLD AUTO: 0 % (ref 0.3–6.2)
ERYTHROCYTE [DISTWIDTH] IN BLOOD BY AUTOMATED COUNT: 12.2 % (ref 12.3–15.4)
GFR SERPL CREATININE-BSD FRML MDRD: 63 ML/MIN/1.73
GLUCOSE SERPL-MCNC: 142 MG/DL (ref 65–99)
GLUCOSE UR STRIP-MCNC: NEGATIVE MG/DL
HCT VFR BLD AUTO: 40.7 % (ref 34–46.6)
HGB BLD-MCNC: 13.6 G/DL (ref 12–15.9)
HGB UR QL STRIP.AUTO: ABNORMAL
HYALINE CASTS UR QL AUTO: ABNORMAL /LPF
IMM GRANULOCYTES # BLD AUTO: 0.06 10*3/MM3 (ref 0–0.05)
IMM GRANULOCYTES NFR BLD AUTO: 0.5 % (ref 0–0.5)
KETONES UR QL STRIP: NEGATIVE
LEUKOCYTE ESTERASE UR QL STRIP.AUTO: NEGATIVE
LYMPHOCYTES # BLD AUTO: 0.79 10*3/MM3 (ref 0.7–3.1)
LYMPHOCYTES NFR BLD AUTO: 6.2 % (ref 19.6–45.3)
MCH RBC QN AUTO: 30.2 PG (ref 26.6–33)
MCHC RBC AUTO-ENTMCNC: 33.4 G/DL (ref 31.5–35.7)
MCV RBC AUTO: 90.2 FL (ref 79–97)
MONOCYTES # BLD AUTO: 0.34 10*3/MM3 (ref 0.1–0.9)
MONOCYTES NFR BLD AUTO: 2.7 % (ref 5–12)
NEUTROPHILS NFR BLD AUTO: 11.51 10*3/MM3 (ref 1.7–7)
NEUTROPHILS NFR BLD AUTO: 90.4 % (ref 42.7–76)
NITRITE UR QL STRIP: NEGATIVE
NRBC BLD AUTO-RTO: 0 /100 WBC (ref 0–0.2)
PH UR STRIP.AUTO: 7.5 [PH] (ref 4.5–8)
PLATELET # BLD AUTO: 313 10*3/MM3 (ref 140–450)
PMV BLD AUTO: 9.2 FL (ref 6–12)
POTASSIUM SERPL-SCNC: 3.7 MMOL/L (ref 3.5–5.2)
PROT UR QL STRIP: ABNORMAL
RBC # BLD AUTO: 4.51 10*6/MM3 (ref 3.77–5.28)
RBC # UR STRIP: ABNORMAL /HPF
REF LAB TEST METHOD: ABNORMAL
SODIUM SERPL-SCNC: 135 MMOL/L (ref 136–145)
SP GR UR STRIP: 1.02 (ref 1–1.03)
SQUAMOUS #/AREA URNS HPF: ABNORMAL /HPF
UROBILINOGEN UR QL STRIP: ABNORMAL
WBC # UR STRIP: ABNORMAL /HPF
WBC NRBC COR # BLD: 12.72 10*3/MM3 (ref 3.4–10.8)

## 2022-02-10 PROCEDURE — 25010000002 ONDANSETRON PER 1 MG: Performed by: EMERGENCY MEDICINE

## 2022-02-10 PROCEDURE — 96375 TX/PRO/DX INJ NEW DRUG ADDON: CPT

## 2022-02-10 PROCEDURE — 80048 BASIC METABOLIC PNL TOTAL CA: CPT | Performed by: EMERGENCY MEDICINE

## 2022-02-10 PROCEDURE — 85025 COMPLETE CBC W/AUTO DIFF WBC: CPT | Performed by: EMERGENCY MEDICINE

## 2022-02-10 PROCEDURE — 99283 EMERGENCY DEPT VISIT LOW MDM: CPT

## 2022-02-10 PROCEDURE — 25010000002 KETOROLAC TROMETHAMINE PER 15 MG: Performed by: EMERGENCY MEDICINE

## 2022-02-10 PROCEDURE — 74176 CT ABD & PELVIS W/O CONTRAST: CPT

## 2022-02-10 PROCEDURE — 96374 THER/PROPH/DIAG INJ IV PUSH: CPT

## 2022-02-10 PROCEDURE — 99283 EMERGENCY DEPT VISIT LOW MDM: CPT | Performed by: EMERGENCY MEDICINE

## 2022-02-10 PROCEDURE — 81001 URINALYSIS AUTO W/SCOPE: CPT | Performed by: EMERGENCY MEDICINE

## 2022-02-10 RX ORDER — KETOROLAC TROMETHAMINE 30 MG/ML
15 INJECTION, SOLUTION INTRAMUSCULAR; INTRAVENOUS ONCE
Status: COMPLETED | OUTPATIENT
Start: 2022-02-10 | End: 2022-02-10

## 2022-02-10 RX ORDER — ONDANSETRON 2 MG/ML
4 INJECTION INTRAMUSCULAR; INTRAVENOUS ONCE
Status: COMPLETED | OUTPATIENT
Start: 2022-02-10 | End: 2022-02-10

## 2022-02-10 RX ORDER — SODIUM CHLORIDE 0.9 % (FLUSH) 0.9 %
10 SYRINGE (ML) INJECTION AS NEEDED
Status: DISCONTINUED | OUTPATIENT
Start: 2022-02-10 | End: 2022-02-10 | Stop reason: HOSPADM

## 2022-02-10 RX ORDER — SODIUM CHLORIDE 9 MG/ML
INJECTION, SOLUTION INTRAVENOUS
Status: DISCONTINUED
Start: 2022-02-10 | End: 2022-02-10 | Stop reason: HOSPADM

## 2022-02-10 RX ADMIN — KETOROLAC TROMETHAMINE 15 MG: 30 INJECTION, SOLUTION INTRAMUSCULAR; INTRAVENOUS at 08:01

## 2022-02-10 RX ADMIN — ONDANSETRON 4 MG: 2 INJECTION INTRAMUSCULAR; INTRAVENOUS at 08:00

## 2022-02-10 RX ADMIN — SODIUM CHLORIDE 500 ML: 9 INJECTION, SOLUTION INTRAVENOUS at 08:00

## 2022-02-10 NOTE — ED PROVIDER NOTES
Subjective   History of Present Illness  Pleasant 46-year-old female presents to the emergency room complaining of right-sided flank pain.  She said it started last night and it was a sharp flank pain that radiates down towards the groin on the right.  She says the pain waxes and wanes but has been present since it started.  It is associated with some nausea when the pain gets bad.  She does not have any dysuria and she has not noted any hematuria.  She reports she felt like she may have some chills but did not check her temperature.  She says she has a history of kidney stones and this feels very similar to that.  Review of Systems   Constitutional: Positive for chills. Negative for fever.   Respiratory: Negative for chest tightness and shortness of breath.    Gastrointestinal: Positive for nausea and vomiting. Negative for abdominal pain.   Genitourinary: Positive for flank pain. Negative for difficulty urinating and dysuria.       Past Medical History:   Diagnosis Date   • Arthritis    • Colon polyp    • Disease of thyroid gland    • CAVANAUGH (dyspnea on exertion) 2017   • Fibromyalgia    • H/O foreign travel 07/2018    [place]   • Heart murmur    • History of constipation    • History of esophageal spasm    • History of gallstones    • IBS (irritable bowel syndrome)    • Kidney stones    • Melanoma (HCC) 08/01/2018    x2   • Tattoos        Allergies   Allergen Reactions   • Align [Acidophilus] Itching and Swelling       Past Surgical History:   Procedure Laterality Date   • BACK SURGERY  2008    Lumbar   • CHOLECYSTECTOMY  2009   • COLONOSCOPY  2009   • HYSTEROSCOPY ENDOMETRIAL ABLATION     • SALPINGO OOPHORECTOMY  2014   • SKIN CANCER EXCISION  08/01/2018    stage 2 back right shoulder    • TUBAL ABDOMINAL LIGATION         Family History   Problem Relation Age of Onset   • Breast cancer Mother 30   • Breast cancer Maternal Aunt 60   • Colon polyps Father    • Colon polyps Paternal Grandmother    • Heart disease  Maternal Grandmother         CABG in her 50s   • Hypertension Other    • Cancer Other    • Diabetes Other    • No Known Problems Daughter    • No Known Problems Son    • No Known Problems Son    • Colon cancer Paternal Great-Grandfather        Social History     Socioeconomic History   • Marital status:      Spouse name: Ryan   • Number of children: 3   • Years of education: High school   Tobacco Use   • Smoking status: Never Smoker   • Smokeless tobacco: Never Used   Vaping Use   • Vaping Use: Never used   Substance and Sexual Activity   • Alcohol use: Yes     Comment: Occasional   • Drug use: Never   • Sexual activity: Yes     Partners: Male     Birth control/protection: Surgical     Comment: spouse = RYAN           Objective    ED Triage Vitals [02/10/22 0743]   Temp Heart Rate Resp BP SpO2   98.1 °F (36.7 °C) 79 16 138/83 96 %      Temp src Heart Rate Source Patient Position BP Location FiO2 (%)   Oral Monitor -- -- --       Physical Exam  INITIAL VITAL SIGNS: Reviewed by me.  Pulse ox normal  GENERAL: Alert and interactive. No acute distress.  HEAD: Head is normocephalic.  EYES: EOMI. PERRL. No scleral icterus. No conjunctival injection.  ENT: Moist mucous membranes.   NECK: Supple. Full range of motion.  RESPIRATORY: No tachypnea.   ABDOMEN: Soft, nondistended, nontender.  BACK:  No obvious deformity.  CVA tenderness to percussion on the right, not the left  EXTREMITIES: No deformity. No clubbing or cyanosis. No edema.   SKIN: Warm and dry. No diaphoresis. No obvious rashes.   NEUROLOGIC: Alert and oriented.  Speech is normal    Procedures           ED Course  ED Course as of 02/10/22 0915   Thu Feb 10, 2022   0840 Patient has a history for and looks very uncomfortable like a kidney stone.  Will check labs get a CT scan.  Giving fluids and Toradol and Zofran in the meantime [RO]   0912 CT showing evidence of having had a stone but appears she recently passed a.  I went to talk to the patient she says  she feels much better and that she a fact did pass a stone when she gave us a urine sample here.  No evidence of infection, discharging home. [RO]      ED Course User Index  [RO] Molina Teran MD                                                 [facility]    Final diagnoses:   Ureterolithiasis       ED Disposition  ED Disposition     ED Disposition Condition Comment    Discharge Good           Meenu Vann, APRN  [address]  [facility] [address] [phone]    Call   To schedule follow-up appointment    Aston Whitten MD  [facility] [address] [address] [phone]      As needed         Medication List      No changes were made to your prescriptions during this visit.          Molina Teran MD  02/10/22 0967

## 2022-02-10 NOTE — DISCHARGE INSTRUCTIONS
Please follow-up with Dr. Whitten as needed.  Return to the emergency room if you develop chest pain, shortness of breath, severe abdominal pain or for any other concerns.

## 2022-02-16 ENCOUNTER — APPOINTMENT (OUTPATIENT)
Dept: MRI IMAGING | Facility: HOSPITAL | Age: 47
End: 2022-02-16

## 2022-08-31 ENCOUNTER — TRANSCRIBE ORDERS (OUTPATIENT)
Dept: ADMINISTRATIVE | Facility: HOSPITAL | Age: 47
End: 2022-08-31

## 2022-08-31 ENCOUNTER — HOSPITAL ENCOUNTER (OUTPATIENT)
Dept: GENERAL RADIOLOGY | Facility: HOSPITAL | Age: 47
Discharge: HOME | End: 2022-08-31
Admitting: NURSE PRACTITIONER

## 2022-08-31 DIAGNOSIS — M25.511 RIGHT SHOULDER PAIN, UNSPECIFIED CHRONICITY: Primary | ICD-10-CM

## 2022-08-31 PROCEDURE — 73010 X-RAY EXAM OF SHOULDER BLADE: CPT

## 2022-10-25 ENCOUNTER — OFFICE VISIT (OUTPATIENT)
Dept: GASTROENTEROLOGY | Facility: CLINIC | Age: 47
End: 2022-10-25

## 2022-10-25 VITALS
BODY MASS INDEX: 23.91 KG/M2 | DIASTOLIC BLOOD PRESSURE: 80 MMHG | SYSTOLIC BLOOD PRESSURE: 124 MMHG | HEIGHT: 69 IN | WEIGHT: 161.4 LBS

## 2022-10-25 DIAGNOSIS — K31.84 GASTROPARESIS: ICD-10-CM

## 2022-10-25 DIAGNOSIS — K22.4 DIFFUSE SPASM OF ESOPHAGUS: ICD-10-CM

## 2022-10-25 DIAGNOSIS — K21.00 GASTROESOPHAGEAL REFLUX DISEASE WITH ESOPHAGITIS, UNSPECIFIED WHETHER HEMORRHAGE: Primary | ICD-10-CM

## 2022-10-25 DIAGNOSIS — K59.04 CHRONIC IDIOPATHIC CONSTIPATION: ICD-10-CM

## 2022-10-25 PROCEDURE — 99204 OFFICE O/P NEW MOD 45 MIN: CPT

## 2022-10-25 RX ORDER — VALACYCLOVIR HYDROCHLORIDE 1 G/1
TABLET, FILM COATED ORAL AS NEEDED
COMMUNITY
End: 2022-10-25

## 2022-10-25 RX ORDER — SUCRALFATE 1 G/1
1 TABLET ORAL 4 TIMES DAILY
Qty: 120 TABLET | Refills: 11 | Status: SHIPPED | OUTPATIENT
Start: 2022-10-25

## 2022-10-25 RX ORDER — AMOXICILLIN AND CLAVULANATE POTASSIUM 875; 125 MG/1; MG/1
TABLET, FILM COATED ORAL
COMMUNITY
End: 2022-10-25

## 2022-10-25 RX ORDER — DILTIAZEM HYDROCHLORIDE 120 MG/1
CAPSULE, EXTENDED RELEASE ORAL
COMMUNITY

## 2022-10-25 RX ORDER — AMOXICILLIN 875 MG/1
TABLET, COATED ORAL
COMMUNITY
End: 2022-10-25

## 2022-10-25 RX ORDER — OMEPRAZOLE 40 MG/1
CAPSULE, DELAYED RELEASE ORAL DAILY
COMMUNITY
End: 2022-10-25 | Stop reason: SDUPTHER

## 2022-10-25 RX ORDER — FLUCONAZOLE 150 MG/1
TABLET ORAL
COMMUNITY
End: 2022-10-25

## 2022-10-25 RX ORDER — OMEPRAZOLE 40 MG/1
40 CAPSULE, DELAYED RELEASE ORAL
Qty: 180 CAPSULE | Refills: 3 | Status: SHIPPED | OUTPATIENT
Start: 2022-10-25

## 2022-10-25 RX ORDER — DILTIAZEM HYDROCHLORIDE 120 MG/1
CAPSULE, COATED, EXTENDED RELEASE ORAL
COMMUNITY
Start: 2022-09-14 | End: 2022-10-25 | Stop reason: SDUPTHER

## 2022-10-25 NOTE — PROGRESS NOTES
PATIENT INFORMATION  Nikole Barnes       - 1975    CHIEF COMPLAINT  Chief Complaint   Patient presents with   • Follow-up     Esophageal spasms, GERD, IBS-D, Gastroparesis.        HISTORY OF PRESENT ILLNESS  Here today to re-establish care for GERD and esophageal spasms    Out of omeprazole for a year and has been taking tums regularly. Now spasms much more frequent and severe. 3 recent episodes of spasms, sometimes 3 in a day. Cannot breath when episode, terrible epigastric pain, tender to touch. Hands, feet, tongue goes numb and hand spasm. GES in 2019 with 19% residual at 4 hours, but no follow up appointment. Was on 30mg QID PRN diltiazem, but appears to be changed to the 24 hour 120mg by cardiology. Was on tami diet until august, which is a low carb diet. Is followed by cardiology which is aware of spasms. Nightly reflux. Has not been on GP diet.    Constipation, was on linzess daily, but severe cramping. Now daily BMs with miralax. Will skip 4-5 days if forgets miralax. No pain meds, not diabetic.    6/10/19 last EGD and colon; gastritis and reflux esophagitis, 0 adenomas with family hx polyps. Recall for colon in for 5 years.          REVIEWED PERTINENT RESULTS/ LABS  Lab Results   Component Value Date    CASEREPORT  06/10/2019     Surgical Pathology Report                         Case: [id number]                                  Authorizing Provider:  Angelo Priest        Collected:           06/10/2019 09:50 AM                                 MD Wayne                                                                   Pathologist:           April Biswas MD  Received:            06/10/2019 03:27 PM          Specimens:   1) - Gastric, 1) gastric bxs                                                                        2) - Esophagus, 2) esophageal bxs                                                                   3) - Large Intestine, Rectum, 3) rectal polyp                                               FINALDX  06/10/2019     1. Stomach, Biopsy:   A. Benign gastric oxyntic mucosa with mild gastropathy.   B. Negative for Helicobacter by routine staining.   C. No metaplasia, dysplasia nor malignancy identified.    2. Esophagus, Biopsy:    A. Benign gastroesophageal junction mucosa with reflux esophagitis.   B. No goblet cell metaplasia identified by routine staining.   C. Negative for dysplasia.    3. Rectum, Biopsy:    A. Benign, mildly hyperplastic rectal mucosa with lymphoid aggregates.   B. No evidence of colitis nor dysplasia identified.    swm/jse        Lab Results   Component Value Date    HGB 13.6 02/10/2022    MCV 90.2 02/10/2022     02/10/2022    ALT 11 08/19/2016    AST 20 08/19/2016      No results found.    REVIEW OF SYSTEMS  Review of Systems   Constitutional: Negative.    HENT: Negative.    Eyes: Negative.    Respiratory: Negative.    Cardiovascular: Negative.    Gastrointestinal: Positive for constipation, diarrhea and nausea.        Esophageal spasms   Endocrine: Negative.    Genitourinary: Negative.    Musculoskeletal: Positive for arthralgias and back pain.   Skin: Negative.    Allergic/Immunologic: Positive for environmental allergies.   Neurological: Negative.  Negative for numbness.   Hematological: Bruises/bleeds easily.   Psychiatric/Behavioral: The patient is nervous/anxious.          ACTIVE PROBLEMS  Patient Active Problem List    Diagnosis    • Chronic idiopathic constipation [K59.04]    • At high risk for breast cancer [Z91.89]    • Family history of breast cancer [Z80.3]    • Fibromyalgia [M79.7]    • Diffuse spasm of esophagus [K22.4]    • Murmur [R01.1]    • Family history of polyps in the colon [Z83.71]    • Abdominal cramping [R10.9]    • Encounter for screening for malignant neoplasm of colon [Z12.11]    • Malignant melanoma (HCC) [C43.9]    • Acquired hypothyroidism [E03.9]    • Irritable bowel syndrome with diarrhea [K58.0]    • Menopausal  symptoms [N95.1]    • Hypothyroidism due to acquired atrophy of thyroid [E03.4]    • Gastroesophageal reflux disease [K21.9]    • Chest pain [R07.9]    • Diarrhea [R19.7]          PAST MEDICAL HISTORY  Past Medical History:   Diagnosis Date   • Arthritis    • Colon polyp    • Disease of thyroid gland    • CAVANAUGH (dyspnea on exertion) 2017   • Fibromyalgia    • H/O foreign travel 07/2018    [place]   • Heart murmur    • History of constipation    • History of esophageal spasm    • History of gallstones    • IBS (irritable bowel syndrome)    • Kidney stones    • Melanoma (HCC) 08/01/2018    x2   • Tattoos          SURGICAL HISTORY  Past Surgical History:   Procedure Laterality Date   • BACK SURGERY  2008    Lumbar   • CHOLECYSTECTOMY  2009   • COLONOSCOPY  2009   • HYSTEROSCOPY ENDOMETRIAL ABLATION     • SALPINGO OOPHORECTOMY  2014   • SKIN CANCER EXCISION  08/01/2018    stage 2 back right shoulder    • TUBAL ABDOMINAL LIGATION           FAMILY HISTORY  Family History   Problem Relation Age of Onset   • Breast cancer Mother 30   • Breast cancer Maternal Aunt 60   • Colon polyps Father    • Colon polyps Paternal Grandmother    • Heart disease Maternal Grandmother         CABG in her 50s   • Hypertension Other    • Cancer Other    • Diabetes Other    • No Known Problems Daughter    • No Known Problems Son    • No Known Problems Son    • Colon cancer Paternal Great-Grandfather          SOCIAL HISTORY  Social History     Occupational History   • Occupation: Teller     Comment: [employer]   Tobacco Use   • Smoking status: Never   • Smokeless tobacco: Never   Vaping Use   • Vaping Use: Never used   Substance and Sexual Activity   • Alcohol use: Yes     Comment: Occasional   • Drug use: Never   • Sexual activity: Yes     Partners: Male     Birth control/protection: Surgical     Comment: spouse = MEGHANA         CURRENT MEDICATIONS    Current Outpatient Medications:   •  CORY THYROID 90 MG tablet, , Disp: , Rfl:   •   "dilTIAZem (TIAZAC) 120 MG 24 hr capsule, diltiazem  mg capsule,extended release 24 hr, Disp: , Rfl:   •  escitalopram (LEXAPRO) 20 MG tablet, escitalopram 20 mg tablet  TAKE ONE TABLET BY MOUTH DAILY, Disp: , Rfl:   •  omeprazole (priLOSEC) 40 MG capsule, Take 1 capsule by mouth 2 (Two) Times a Day Before Meals., Disp: 180 capsule, Rfl: 3  •  sucralfate (Carafate) 1 g tablet, Take 1 tablet by mouth 4 (Four) Times a Day., Disp: 120 tablet, Rfl: 11    ALLERGIES  Align [acidophilus]    VITALS  Vitals:    10/25/22 1029   BP: 124/80   BP Location: Right arm   Patient Position: Sitting   Cuff Size: Adult   Weight: 73.2 kg (161 lb 6.4 oz)   Height: 175.3 cm (69.02\")       PHYSICAL EXAM  Debilities/Disabilities Identified: None  Emotional Behavior: Appropriate  Wt Readings from Last 3 Encounters:   10/25/22 73.2 kg (161 lb 6.4 oz)   02/10/22 77.1 kg (170 lb)   11/16/21 81.2 kg (179 lb)     Ht Readings from Last 1 Encounters:   10/25/22 175.3 cm (69.02\")     Body mass index is 23.82 kg/m².  Physical Exam  Constitutional:       General: She is not in acute distress.     Appearance: Normal appearance. She is not ill-appearing.   HENT:      Head: Normocephalic and atraumatic.      Mouth/Throat:      Mouth: Mucous membranes are moist.      Pharynx: No posterior oropharyngeal erythema.   Eyes:      General: No scleral icterus.  Cardiovascular:      Rate and Rhythm: Normal rate and regular rhythm.      Pulses: Normal pulses.      Heart sounds: Normal heart sounds.   Pulmonary:      Effort: Pulmonary effort is normal.      Breath sounds: Normal breath sounds.   Abdominal:      General: Abdomen is flat. Bowel sounds are normal. There is no distension.      Palpations: Abdomen is soft. There is no mass.      Tenderness: There is abdominal tenderness in the epigastric area and left upper quadrant. There is no guarding or rebound. Negative signs include Rojo's sign.      Hernia: No hernia is present.   Musculoskeletal:      " Cervical back: Neck supple.   Skin:     General: Skin is warm.      Capillary Refill: Capillary refill takes less than 2 seconds.   Neurological:      General: No focal deficit present.      Mental Status: She is alert and oriented to person, place, and time.   Psychiatric:         Mood and Affect: Mood normal.         Behavior: Behavior normal.         Thought Content: Thought content normal.         Judgment: Judgment normal.         CLINICAL DATA REVIEWED   reviewed previous lab results and integrated with today's visit, reviewed notes from other physicians and/or last GI encounter, reviewed previous endoscopy results and available photos, reviewed surgical pathology results from previous biopsies    ASSESSMENT  Diagnoses and all orders for this visit:    Gastroesophageal reflux disease with esophagitis, unspecified whether hemorrhage    Chronic idiopathic constipation    Diffuse spasm of esophagus    Gastroparesis    Other orders  -     Discontinue: amoxicillin (AMOXIL) 875 MG tablet; amoxicillin 875 mg tablet  -     Discontinue: amoxicillin-clavulanate (AUGMENTIN) 875-125 MG per tablet; amoxicillin 875 mg-potassium clavulanate 125 mg tablet  -     Discontinue: dilTIAZem CD (CARDIZEM CD) 120 MG 24 hr capsule  -     dilTIAZem (TIAZAC) 120 MG 24 hr capsule; diltiazem  mg capsule,extended release 24 hr  -     Discontinue: fluconazole (DIFLUCAN) 150 MG tablet; fluconazole 150 mg tablet  -     Discontinue: omeprazole (priLOSEC) 40 MG capsule; Daily.  -     Discontinue: valACYclovir (VALTREX) 1000 MG tablet; As Needed.  -     omeprazole (priLOSEC) 40 MG capsule; Take 1 capsule by mouth 2 (Two) Times a Day Before Meals.  -     sucralfate (Carafate) 1 g tablet; Take 1 tablet by mouth 4 (Four) Times a Day.          PLAN  Ordered omeprazole and carafate QID. Reviewed GP diet.  Continue daily miralax.  Repeat colon 2024.    Return in about 3 months (around 1/25/2023).    I have discussed the above plan with the  patient.  They verbalize understanding and are in agreement with the plan.  They have been advised to contact the office for any questions, concerns, or changes related to their health.

## 2022-10-27 ENCOUNTER — TELEPHONE (OUTPATIENT)
Dept: GASTROENTEROLOGY | Facility: CLINIC | Age: 47
End: 2022-10-27

## 2023-02-21 ENCOUNTER — TRANSCRIBE ORDERS (OUTPATIENT)
Dept: ADMINISTRATIVE | Facility: HOSPITAL | Age: 48
End: 2023-02-21
Payer: COMMERCIAL

## 2023-02-21 DIAGNOSIS — Z12.31 VISIT FOR SCREENING MAMMOGRAM: Primary | ICD-10-CM

## 2023-03-10 ENCOUNTER — HOSPITAL ENCOUNTER (OUTPATIENT)
Dept: MAMMOGRAPHY | Facility: HOSPITAL | Age: 48
Discharge: HOME | End: 2023-03-10
Admitting: NURSE PRACTITIONER
Payer: COMMERCIAL

## 2023-03-10 DIAGNOSIS — Z12.31 VISIT FOR SCREENING MAMMOGRAM: ICD-10-CM

## 2023-03-10 PROCEDURE — 77067 SCR MAMMO BI INCL CAD: CPT

## 2023-03-10 PROCEDURE — 77063 BREAST TOMOSYNTHESIS BI: CPT

## 2023-06-14 ENCOUNTER — OFFICE VISIT (OUTPATIENT)
Dept: GASTROENTEROLOGY | Facility: CLINIC | Age: 48
End: 2023-06-14
Payer: COMMERCIAL

## 2023-06-14 VITALS
SYSTOLIC BLOOD PRESSURE: 110 MMHG | BODY MASS INDEX: 24.53 KG/M2 | DIASTOLIC BLOOD PRESSURE: 70 MMHG | HEIGHT: 69 IN | WEIGHT: 165.6 LBS

## 2023-06-14 DIAGNOSIS — K31.84 GASTROPARESIS: ICD-10-CM

## 2023-06-14 DIAGNOSIS — K22.4 DIFFUSE SPASM OF ESOPHAGUS: ICD-10-CM

## 2023-06-14 DIAGNOSIS — K59.04 CHRONIC IDIOPATHIC CONSTIPATION: Primary | ICD-10-CM

## 2023-06-14 DIAGNOSIS — K21.00 GASTROESOPHAGEAL REFLUX DISEASE WITH ESOPHAGITIS, UNSPECIFIED WHETHER HEMORRHAGE: ICD-10-CM

## 2023-06-14 RX ORDER — FAMOTIDINE 40 MG/1
40 TABLET, FILM COATED ORAL 2 TIMES DAILY
Qty: 180 TABLET | Refills: 3 | Status: SHIPPED | OUTPATIENT
Start: 2023-06-14

## 2023-06-14 RX ORDER — ESTRADIOL 0.1 MG/G
2 CREAM VAGINAL DAILY
COMMUNITY

## 2023-06-14 RX ORDER — PRUCALOPRIDE 2 MG/1
1 TABLET, FILM COATED ORAL DAILY
Qty: 90 TABLET | Refills: 3 | Status: SHIPPED | OUTPATIENT
Start: 2023-06-14

## 2023-06-14 RX ORDER — PROGESTERONE 100 MG/1
100 CAPSULE ORAL DAILY
COMMUNITY

## 2023-06-14 NOTE — PROGRESS NOTES
PATIENT INFORMATION  Nikole Barnes       - 1975    CHIEF COMPLAINT  Chief Complaint   Patient presents with    Heartburn    Constipation    Gastroparesis       HISTORY OF PRESENT ILLNESS  Here today for esophageal spasm    Back on omeprazole BID. 2 esophageal spasms, beginning of May lasted 3 hours, 2 weeks later turned over and had another that lasted 15 min, not able to take any AA at that time. Reflux if bending over, night time reflux resolved. Spasms much improved from previous. Still on ER diltiazem. Eating less because filling up quicker.    GES  with 19% residual at 4 hours, reviewed GP recommendation    Constipation, was on linzess daily, but severe cramping. Now daily BMs with miralax. Will skip 4-5 days if forgets miralax. No pain meds, not diabetic. Today with daily miralax still difficulty with constipation. Skipping up to 2 weeks without. No bleeding or straining, does have a lot of cramping. Once breaks loose all liquid. Drinking a lot of water.     6/10/19 last EGD and colon; gastritis and reflux esophagitis, 0 adenomas with family hx polyps. Recall for .      Heartburn    Constipation  Associated symptoms include back pain.     REVIEWED PERTINENT RESULTS/ LABS  Lab Results   Component Value Date    CASEREPORT  06/10/2019     Surgical Pathology Report                         Case: [id number]                                  Authorizing Provider:  Angelo Priest        Collected:           06/10/2019 09:50 AM                                 MD Wayne                                                                   Pathologist:           April Biswas MD  Received:            06/10/2019 03:27 PM          Specimens:   1) - Gastric, 1) gastric bxs                                                                        2) - Esophagus, 2) esophageal bxs                                                                   3) - Large Intestine, Rectum, 3) rectal polyp                                               FINALDX  06/10/2019     1. Stomach, Biopsy:   A. Benign gastric oxyntic mucosa with mild gastropathy.   B. Negative for Helicobacter by routine staining.   C. No metaplasia, dysplasia nor malignancy identified.    2. Esophagus, Biopsy:    A. Benign gastroesophageal junction mucosa with reflux esophagitis.   B. No goblet cell metaplasia identified by routine staining.   C. Negative for dysplasia.    3. Rectum, Biopsy:    A. Benign, mildly hyperplastic rectal mucosa with lymphoid aggregates.   B. No evidence of colitis nor dysplasia identified.    swm/jse        Lab Results   Component Value Date    HGB 13.6 02/10/2022    MCV 90.2 02/10/2022     02/10/2022    ALT 11 08/19/2016    AST 20 08/19/2016      No results found.    REVIEW OF SYSTEMS  Review of Systems   Constitutional: Negative.    Eyes: Negative.    Respiratory: Negative.     Cardiovascular:  Positive for palpitations.   Gastrointestinal:  Positive for constipation.        GERD, CIC, Gastroparesis   Endocrine: Negative.    Genitourinary: Negative.    Musculoskeletal:  Positive for back pain.   Skin: Negative.    Allergic/Immunologic: Negative.    Neurological:  Positive for headaches.   Hematological:  Bruises/bleeds easily.   Psychiatric/Behavioral:  The patient is nervous/anxious.        ACTIVE PROBLEMS  Patient Active Problem List    Diagnosis     Chronic idiopathic constipation [K59.04]     At high risk for breast cancer [Z91.89]     Family history of breast cancer [Z80.3]     Fibromyalgia [M79.7]     Diffuse spasm of esophagus [K22.4]     Murmur [R01.1]     Family history of polyps in the colon [Z83.71]     Abdominal cramping [R10.9]     Encounter for screening for malignant neoplasm of colon [Z12.11]     Malignant melanoma [C43.9]     Acquired hypothyroidism [E03.9]     Irritable bowel syndrome with diarrhea [K58.0]     Menopausal symptoms [N95.1]     Hypothyroidism due to acquired atrophy of thyroid  [E03.4]     Gastroesophageal reflux disease [K21.9]     Chest pain [R07.9]     Diarrhea [R19.7]          PAST MEDICAL HISTORY  Past Medical History:   Diagnosis Date    Arthritis     Colon polyp     Disease of thyroid gland     CAVANAUGH (dyspnea on exertion) 2017    Fibromyalgia     GERD (gastroesophageal reflux disease)     H/O foreign travel 07/2018    [place]    Heart murmur     History of constipation     History of esophageal spasm     History of gallstones     IBS (irritable bowel syndrome)     Kidney stones     Melanoma 08/01/2018    x2    Tattoos          SURGICAL HISTORY  Past Surgical History:   Procedure Laterality Date    BACK SURGERY  2008    Lumbar    CHOLECYSTECTOMY  2009    COLONOSCOPY  2009    HYSTEROSCOPY ENDOMETRIAL ABLATION      SALPINGO OOPHORECTOMY  2014    SKIN CANCER EXCISION  08/01/2018    stage 2 back right shoulder     TUBAL ABDOMINAL LIGATION           FAMILY HISTORY  Family History   Problem Relation Age of Onset    Breast cancer Mother 30    Breast cancer Maternal Aunt 60    Colon polyps Father     Colon polyps Paternal Grandmother     Heart disease Maternal Grandmother         CABG in her 50s    Hypertension Other     Cancer Other     Diabetes Other     No Known Problems Daughter     No Known Problems Son     No Known Problems Son     Colon cancer Paternal Great-Grandfather          SOCIAL HISTORY  Social History     Occupational History    Occupation: [employer]     Comment: [employer]   Tobacco Use    Smoking status: Never    Smokeless tobacco: Never   Vaping Use    Vaping Use: Never used   Substance and Sexual Activity    Alcohol use: Yes     Comment: Occasional    Drug use: Never    Sexual activity: Yes     Partners: Male     Birth control/protection: Surgical     Comment: spouse = MEGHANA         CURRENT MEDICATIONS    Current Outpatient Medications:     CORY THYROID 90 MG tablet, , Disp: , Rfl:     dilTIAZem (TIAZAC) 120 MG 24 hr capsule, diltiazem  mg capsule,extended  "release 24 hr, Disp: , Rfl:     escitalopram (LEXAPRO) 20 MG tablet, escitalopram 20 mg tablet  TAKE ONE TABLET BY MOUTH DAILY, Disp: , Rfl:     estradiol (ESTRACE) 0.1 MG/GM vaginal cream, Insert 2 g into the vagina Daily., Disp: , Rfl:     omeprazole (priLOSEC) 40 MG capsule, Take 1 capsule by mouth 2 (Two) Times a Day Before Meals., Disp: 180 capsule, Rfl: 3    Progesterone (PROMETRIUM) 100 MG capsule, Take 1 capsule by mouth Daily., Disp: , Rfl:     famotidine (PEPCID) 40 MG tablet, Take 1 tablet by mouth 2 (Two) Times a Day. Lunch and bedtime, Disp: 180 tablet, Rfl: 3    Prucalopride Succinate (Motegrity) 2 MG tablet, Take 1 tablet by mouth Daily., Disp: 90 tablet, Rfl: 3    ALLERGIES  Align [acidophilus]    VITALS  Vitals:    06/14/23 1433   BP: 110/70   BP Location: Left arm   Patient Position: Sitting   Cuff Size: Adult   Weight: 75.1 kg (165 lb 9.6 oz)   Height: 175.3 cm (69.02\")       PHYSICAL EXAM  Debilities/Disabilities Identified: None  Emotional Behavior: Appropriate  Wt Readings from Last 3 Encounters:   06/14/23 75.1 kg (165 lb 9.6 oz)   10/25/22 73.2 kg (161 lb 6.4 oz)   02/10/22 77.1 kg (170 lb)     Ht Readings from Last 1 Encounters:   06/14/23 175.3 cm (69.02\")     Body mass index is 24.44 kg/m².  Physical Exam  Constitutional:       General: She is not in acute distress.     Appearance: Normal appearance. She is not ill-appearing.   HENT:      Head: Normocephalic and atraumatic.      Mouth/Throat:      Mouth: Mucous membranes are moist.      Pharynx: No posterior oropharyngeal erythema.   Eyes:      General: No scleral icterus.  Cardiovascular:      Rate and Rhythm: Normal rate and regular rhythm.      Heart sounds: Normal heart sounds.   Pulmonary:      Effort: Pulmonary effort is normal.      Breath sounds: Normal breath sounds.   Abdominal:      General: Abdomen is flat. Bowel sounds are normal. There is no distension.      Palpations: Abdomen is soft. There is no mass.      Tenderness: " There is generalized no abdominal tenderness. There is no guarding or rebound. Negative signs include Rojo's sign.      Hernia: No hernia is present.   Musculoskeletal:      Cervical back: Neck supple.   Skin:     General: Skin is warm.      Capillary Refill: Capillary refill takes less than 2 seconds.   Neurological:      General: No focal deficit present.      Mental Status: She is alert and oriented to person, place, and time.   Psychiatric:         Mood and Affect: Mood normal.         Behavior: Behavior normal.         Thought Content: Thought content normal.         Judgment: Judgment normal.       CLINICAL DATA REVIEWED   reviewed previous lab results and integrated with today's visit, reviewed notes from other physicians and/or last GI encounter, reviewed previous endoscopy results and available photos, reviewed surgical pathology results from previous biopsies    ASSESSMENT  Diagnoses and all orders for this visit:    Chronic idiopathic constipation  -     Prucalopride Succinate (Motegrity) 2 MG tablet; Take 1 tablet by mouth Daily.    Diffuse spasm of esophagus  -     famotidine (PEPCID) 40 MG tablet; Take 1 tablet by mouth 2 (Two) Times a Day. Lunch and bedtime    Gastroesophageal reflux disease with esophagitis, unspecified whether hemorrhage  -     famotidine (PEPCID) 40 MG tablet; Take 1 tablet by mouth 2 (Two) Times a Day. Lunch and bedtime    Gastroparesis    Other orders  -     estradiol (ESTRACE) 0.1 MG/GM vaginal cream; Insert 2 g into the vagina Daily.  -     Progesterone (PROMETRIUM) 100 MG capsule; Take 1 capsule by mouth Daily.          PLAN    Try motegrity  Start pepcid BID with PPI    No follow-ups on file.    I have discussed the above plan with the patient.  They verbalize understanding and are in agreement with the plan.  They have been advised to contact the office for any questions, concerns, or changes related to their health.

## 2023-08-24 ENCOUNTER — TELEPHONE (OUTPATIENT)
Dept: GASTROENTEROLOGY | Facility: CLINIC | Age: 48
End: 2023-08-24
Payer: COMMERCIAL

## 2023-08-24 NOTE — TELEPHONE ENCOUNTER
[facility] for patient to call office.     ----- Message from Debbie BASURTO Jam sent at 8/24/2023 10:55 AM EDT -----  Regarding: Referral  We received a referral for this patient at  for Dr. Lema. This is a current patient of Flora's. Please reach out to the patient to see if she is ok with seeing Flora as she is established. If so, please schedule patient for a sooner appt. She is currently scheduled mid September.       Please let me know so that I can send the referral souleymane.

## 2023-08-28 ENCOUNTER — TELEPHONE (OUTPATIENT)
Dept: GASTROENTEROLOGY | Facility: CLINIC | Age: 48
End: 2023-08-28

## 2023-08-28 NOTE — TELEPHONE ENCOUNTER
Patient stated that she had severe cramping, migraine, and diarrhea since she took Motegrity.   States she only took medication one time. She would like another treatment option.

## 2023-08-28 NOTE — TELEPHONE ENCOUNTER
Left detailed voicemail about APRN plan.   Start daily miralax to get bowels moving.   Recommended keeping 7 yr recall, unless patient would prefer diagnostic c/s.

## 2023-08-28 NOTE — TELEPHONE ENCOUNTER
Caller: Nikole Barnes    Relationship: Self    Best call back number: [phone]     Who is your current provider: BEATRIZ GONZALEZ     Who would you like your new provider to be: DR PETRA WOODALL    What are your reasons for transferring care: PATIENT STATES THE MEDS THEY HAVE BEEN PRESCRIBED ARE MAKING THEM SICK AND WAS TOLD THEY NEED TO GET A COLONOSCOPY EVERY 7 YRS VS EVERY 5 YRS. PLEASE CALL PATIENT, IF NOT AVAILABLE LEAVE A MESSAGE.

## 2024-01-02 ENCOUNTER — TRANSCRIBE ORDERS (OUTPATIENT)
Dept: ADMINISTRATIVE | Facility: HOSPITAL | Age: 49
End: 2024-01-02
Payer: COMMERCIAL

## 2024-01-02 ENCOUNTER — LAB (OUTPATIENT)
Dept: LAB | Facility: HOSPITAL | Age: 49
End: 2024-01-02
Payer: COMMERCIAL

## 2024-01-02 DIAGNOSIS — I10 ESSENTIAL HYPERTENSION, MALIGNANT: ICD-10-CM

## 2024-01-02 DIAGNOSIS — E03.9 HYPOTHYROIDISM, UNSPECIFIED TYPE: Primary | ICD-10-CM

## 2024-01-02 DIAGNOSIS — E78.5 HYPERLIPIDEMIA, UNSPECIFIED HYPERLIPIDEMIA TYPE: ICD-10-CM

## 2024-01-02 DIAGNOSIS — E03.9 HYPOTHYROIDISM, UNSPECIFIED TYPE: ICD-10-CM

## 2024-01-02 LAB
ALBUMIN SERPL-MCNC: 4.7 G/DL (ref 3.5–5.2)
ALBUMIN/GLOB SERPL: 1.8 G/DL
ALP SERPL-CCNC: 91 U/L (ref 39–117)
ALT SERPL W P-5'-P-CCNC: 13 U/L (ref 1–33)
ANION GAP SERPL CALCULATED.3IONS-SCNC: 13.1 MMOL/L (ref 5–15)
AST SERPL-CCNC: 20 U/L (ref 1–32)
BASOPHILS # BLD AUTO: 0.03 10*3/MM3 (ref 0–0.2)
BASOPHILS NFR BLD AUTO: 0.4 % (ref 0–1.5)
BILIRUB SERPL-MCNC: 0.5 MG/DL (ref 0–1.2)
BUN SERPL-MCNC: 12 MG/DL (ref 6–20)
BUN/CREAT SERPL: 16.4 (ref 7–25)
CALCIUM SPEC-SCNC: 9.7 MG/DL (ref 8.6–10.5)
CHLORIDE SERPL-SCNC: 103 MMOL/L (ref 98–107)
CHOLEST SERPL-MCNC: 226 MG/DL (ref 0–200)
CO2 SERPL-SCNC: 23.9 MMOL/L (ref 22–29)
CREAT SERPL-MCNC: 0.73 MG/DL (ref 0.57–1)
DEPRECATED RDW RBC AUTO: 39.7 FL (ref 37–54)
EGFRCR SERPLBLD CKD-EPI 2021: 101 ML/MIN/1.73
EOSINOPHIL # BLD AUTO: 0.12 10*3/MM3 (ref 0–0.4)
EOSINOPHIL NFR BLD AUTO: 1.8 % (ref 0.3–6.2)
ERYTHROCYTE [DISTWIDTH] IN BLOOD BY AUTOMATED COUNT: 12 % (ref 12.3–15.4)
GLOBULIN UR ELPH-MCNC: 2.6 GM/DL
GLUCOSE SERPL-MCNC: 103 MG/DL (ref 65–99)
HCT VFR BLD AUTO: 42.7 % (ref 34–46.6)
HDLC SERPL-MCNC: 71 MG/DL (ref 40–60)
HGB BLD-MCNC: 14.3 G/DL (ref 12–15.9)
IMM GRANULOCYTES # BLD AUTO: 0.02 10*3/MM3 (ref 0–0.05)
IMM GRANULOCYTES NFR BLD AUTO: 0.3 % (ref 0–0.5)
LDLC SERPL CALC-MCNC: 136 MG/DL (ref 0–100)
LDLC/HDLC SERPL: 1.88 {RATIO}
LYMPHOCYTES # BLD AUTO: 2.46 10*3/MM3 (ref 0.7–3.1)
LYMPHOCYTES NFR BLD AUTO: 36.8 % (ref 19.6–45.3)
MCH RBC QN AUTO: 30.5 PG (ref 26.6–33)
MCHC RBC AUTO-ENTMCNC: 33.5 G/DL (ref 31.5–35.7)
MCV RBC AUTO: 91 FL (ref 79–97)
MONOCYTES # BLD AUTO: 0.48 10*3/MM3 (ref 0.1–0.9)
MONOCYTES NFR BLD AUTO: 7.2 % (ref 5–12)
NEUTROPHILS NFR BLD AUTO: 3.57 10*3/MM3 (ref 1.7–7)
NEUTROPHILS NFR BLD AUTO: 53.5 % (ref 42.7–76)
NRBC BLD AUTO-RTO: 0 /100 WBC (ref 0–0.2)
PLATELET # BLD AUTO: 353 10*3/MM3 (ref 140–450)
PMV BLD AUTO: 9.2 FL (ref 6–12)
POTASSIUM SERPL-SCNC: 4.1 MMOL/L (ref 3.5–5.2)
PROT SERPL-MCNC: 7.3 G/DL (ref 6–8.5)
RBC # BLD AUTO: 4.69 10*6/MM3 (ref 3.77–5.28)
SODIUM SERPL-SCNC: 140 MMOL/L (ref 136–145)
T4 FREE SERPL-MCNC: 1.15 NG/DL (ref 0.93–1.7)
TRIGL SERPL-MCNC: 107 MG/DL (ref 0–150)
TSH SERPL DL<=0.05 MIU/L-ACNC: 0.17 UIU/ML (ref 0.27–4.2)
VLDLC SERPL-MCNC: 19 MG/DL (ref 5–40)
WBC NRBC COR # BLD AUTO: 6.68 10*3/MM3 (ref 3.4–10.8)

## 2024-01-02 PROCEDURE — 80061 LIPID PANEL: CPT

## 2024-01-02 PROCEDURE — 36415 COLL VENOUS BLD VENIPUNCTURE: CPT

## 2024-01-02 PROCEDURE — 80050 GENERAL HEALTH PANEL: CPT

## 2024-01-02 PROCEDURE — 84439 ASSAY OF FREE THYROXINE: CPT

## 2024-03-04 ENCOUNTER — TRANSCRIBE ORDERS (OUTPATIENT)
Dept: ADMINISTRATIVE | Facility: HOSPITAL | Age: 49
End: 2024-03-04
Payer: COMMERCIAL

## 2024-03-04 DIAGNOSIS — M47.26 OTHER SPONDYLOSIS WITH RADICULOPATHY, LUMBAR REGION: Primary | ICD-10-CM

## 2024-03-25 ENCOUNTER — HOSPITAL ENCOUNTER (OUTPATIENT)
Dept: MRI IMAGING | Facility: HOSPITAL | Age: 49
Discharge: HOME | End: 2024-03-25
Admitting: NURSE PRACTITIONER
Payer: COMMERCIAL

## 2024-03-25 DIAGNOSIS — M47.26 OTHER SPONDYLOSIS WITH RADICULOPATHY, LUMBAR REGION: ICD-10-CM

## 2024-03-25 PROCEDURE — 72148 MRI LUMBAR SPINE W/O DYE: CPT
